# Patient Record
Sex: FEMALE | Race: ASIAN | Employment: FULL TIME | URBAN - METROPOLITAN AREA
[De-identification: names, ages, dates, MRNs, and addresses within clinical notes are randomized per-mention and may not be internally consistent; named-entity substitution may affect disease eponyms.]

---

## 2017-06-22 DIAGNOSIS — Z00.00 GENERAL MEDICAL EXAM: Primary | ICD-10-CM

## 2017-06-23 LAB — T PALLIDUM IGG+IGM SER QL: NEGATIVE

## 2017-10-05 DIAGNOSIS — R76.11 POSITIVE SKIN TEST FOR TUBERCULOSIS: Primary | ICD-10-CM

## 2017-10-05 PROCEDURE — 86480 TB TEST CELL IMMUN MEASURE: CPT | Performed by: CLINICAL NURSE SPECIALIST

## 2017-10-05 NOTE — NURSING NOTE
Chief Complaint   Patient presents with     Blood Draw     Labs drawn from left arm in clinic by MA     Pt tolerated well  Leti Cortez MA

## 2017-10-09 LAB
M TB TUBERC IFN-G BLD QL: NEGATIVE
M TB TUBERC IFN-G/MITOGEN IGNF BLD: 0.06 IU/ML

## 2018-01-30 DIAGNOSIS — J01.90 ACUTE NON-RECURRENT SINUSITIS, UNSPECIFIED LOCATION: Primary | ICD-10-CM

## 2018-01-30 RX ORDER — AZITHROMYCIN 250 MG/1
250 TABLET, FILM COATED ORAL DAILY
Qty: 6 TABLET | Refills: 0 | Status: SHIPPED | OUTPATIENT
Start: 2018-01-30 | End: 2018-06-14

## 2018-06-14 ENCOUNTER — OFFICE VISIT (OUTPATIENT)
Dept: OBGYN | Facility: CLINIC | Age: 38
End: 2018-06-14
Attending: OBSTETRICS & GYNECOLOGY
Payer: COMMERCIAL

## 2018-06-14 VITALS
DIASTOLIC BLOOD PRESSURE: 70 MMHG | HEIGHT: 67 IN | HEART RATE: 76 BPM | BODY MASS INDEX: 19.48 KG/M2 | WEIGHT: 124.1 LBS | SYSTOLIC BLOOD PRESSURE: 109 MMHG

## 2018-06-14 DIAGNOSIS — Z13.1 SCREENING FOR DIABETES MELLITUS: ICD-10-CM

## 2018-06-14 DIAGNOSIS — Z01.419 ENCOUNTER FOR GYNECOLOGICAL EXAMINATION WITHOUT ABNORMAL FINDING: ICD-10-CM

## 2018-06-14 DIAGNOSIS — Z12.4 SCREENING FOR MALIGNANT NEOPLASM OF CERVIX: ICD-10-CM

## 2018-06-14 DIAGNOSIS — Z13.21 ENCOUNTER FOR VITAMIN DEFICIENCY SCREENING: ICD-10-CM

## 2018-06-14 DIAGNOSIS — Z13.29 SCREENING FOR THYROID DISORDER: ICD-10-CM

## 2018-06-14 DIAGNOSIS — Z13.220 SCREENING FOR LIPOID DISORDERS: ICD-10-CM

## 2018-06-14 DIAGNOSIS — Z00.00 VISIT FOR PREVENTIVE HEALTH EXAMINATION: Primary | ICD-10-CM

## 2018-06-14 LAB
CHOLEST SERPL-MCNC: 161 MG/DL
HDLC SERPL-MCNC: 51 MG/DL
LDLC SERPL CALC-MCNC: 88 MG/DL
NONHDLC SERPL-MCNC: 110 MG/DL
TRIGL SERPL-MCNC: 111 MG/DL
TSH SERPL DL<=0.005 MIU/L-ACNC: 1.01 MU/L (ref 0.4–4)

## 2018-06-14 PROCEDURE — 80061 LIPID PANEL: CPT | Performed by: OBSTETRICS & GYNECOLOGY

## 2018-06-14 PROCEDURE — 82306 VITAMIN D 25 HYDROXY: CPT | Performed by: OBSTETRICS & GYNECOLOGY

## 2018-06-14 PROCEDURE — G0145 SCR C/V CYTO,THINLAYER,RESCR: HCPCS | Performed by: OBSTETRICS & GYNECOLOGY

## 2018-06-14 PROCEDURE — 87624 HPV HI-RISK TYP POOLED RSLT: CPT | Performed by: OBSTETRICS & GYNECOLOGY

## 2018-06-14 PROCEDURE — G0463 HOSPITAL OUTPT CLINIC VISIT: HCPCS | Mod: ZF

## 2018-06-14 PROCEDURE — 84443 ASSAY THYROID STIM HORMONE: CPT | Performed by: OBSTETRICS & GYNECOLOGY

## 2018-06-14 ASSESSMENT — ANXIETY QUESTIONNAIRES

## 2018-06-14 ASSESSMENT — PATIENT HEALTH QUESTIONNAIRE - PHQ9: 5. POOR APPETITE OR OVEREATING: NOT AT ALL

## 2018-06-14 ASSESSMENT — PAIN SCALES - GENERAL: PAINLEVEL: NO PAIN (0)

## 2018-06-14 NOTE — LETTER
2018       RE: Sakina Dash  [address]  [facility] [address]     Dear Colleague,    Thank you for referring your patient, Sakina Bowling, to [facility] Please see a copy of my visit note below.          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  She is doing well today with no concerns.  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  She has a 4 year old son that was delivered by c/s for arrest of dilation at 5 cm.  Her son was in the NICU for 5 days for r/o sepsis, all cultures were negative.  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  Her periods are regular.  She would like another pregnancy.   She has been attempting conception for the last 3-4 months.  She has used LH kits and get a positive result midcycle.  She has no h/o infertility prior to her first pregnancy.        Concerns today include: none    Menstrual History:  Menstrual History 2018   LAST MENSTRUAL PERIOD 2018 -   Menarche age - 12   Period Cycle (Days) - 28-30   Period Duration (Days) - 3-4   Period Pattern - Regular   Menstrual Flow - Moderate   Dysmenorrhea - Mild   PMS Symptoms - Cramping   Reviewed Today - Yes       Last  No results found for: PAP  History of abnormal Pap smear: NO - age 30-65 PAP every 5 years with negative HPV co-testing recommended    Last No results found for: HPV16  Last No results found for: HPV18  Last No results found for: HRHPV    Mammogram current: not applicable  Last Mammogram:   No results found.     Last Colonoscopy:  No results found for this or any previous visit.      HISTORY:    No current outpatient prescriptions on file prior to visit.  No current facility-administered medications on file prior to visit.   Allergies   Allergen Reactions     Bactrim [Sulfamethoxazole W/Trimethoprim] Rash       There is no immunization history  "on file for this patient.    Obstetric History       T1      L1     SAB0   TAB0   Ectopic0   Multiple0   Live Births0      Past Medical History:   Diagnosis Date     NO ACTIVE PROBLEMS      Past Surgical History:   Procedure Laterality Date     C/SECTION, LOW TRANSVERSE  2014     Family History   Problem Relation Age of Onset     Diabetes Mother      Hypertension Mother      Muscular Dystrophy Mother      Social History     Social History     Marital status:      Spouse name: N/A     Number of children: N/A     Years of education: N/A     Social History Main Topics     Smoking status: Never Smoker     Smokeless tobacco: Never Used     Alcohol use Yes      Comment: 1 every two months     Drug use: No     Sexual activity: Yes     Partners: Male     Other Topics Concern     None     Social History Narrative    How much exercise per week? Walking daily    How much calcium per day? In dairy       How much caffeine per day? 2 cups  tea    How much vitamin D per day? Sun light and foods    Do you/your family wear seatbelts?  Yes    Do you/your family use safety helmets? Yes    Do you/your family use sunscreen? Yes    Do you/your family keep firearms in the home? No    Do you/your family have a smoke detector(s)? Yes        [facility]                    ROS  [unfilled]  PHQ-9 SCORE 2018   Total Score 0     KATIA-7 SCORE 2018   Total Score 0         EXAM:  Blood pressure 109/70, pulse 76, height 1.702 m (5' 7\"), weight 56.3 kg (124 lb 1.6 oz), last menstrual period 2018. Body mass index is 19.44 kg/(m^2).  General - pleasant female in no acute distress.  Skin - no suspicious lesions or rashes  EENT-  PERRLA, euthyroid with out palpable nodules  Neck - supple without lymphadenopathy.  Lungs - clear to auscultation bilaterally.  Heart - regular rate and rhythm without murmur.  Abdomen - soft, nontender, nondistended, no masses or organomegaly noted.  Musculoskeletal - no " gross deformities.  Neurological - normal strength, sensation, and mental status.    Breast Exam:  Breast: Without visible skin changes. No dimpling or lesions seen.   Breasts supple, non-tender with palpation, no dominant mass, nodularity, or nipple discharge noted bilaterally. Axillary nodes negative.      Pelvic Exam:  EG/BUS: Normal genital architecture without lesions, erythema or abnormal secretions Bartholin's, Urethra, Hubbell's normal   Urethral meatus: normal   Urethra: no masses, tenderness, or scarring   Bladder: no masses or tenderness   Vagina: moist, pink, rugae with creamy, white and odorless  secretions  Cervix: Nulliparous, and no lesions  Uterus: small, mobile, no masses  Adnexa: Within normal limits and No masses, nodularity, tenderness  Rectum:anus normal       ASSESSMENT:  Encounter Diagnoses   Name Primary?     Visit for preventive health examination Yes     Screening for diabetes mellitus      Screening for lipoid disorders      Encounter for vitamin deficiency screening      Screening for malignant neoplasm of cervix      Encounter for gynecological examination without abnormal finding      Screening for thyroid disorder         PLAN:   Orders Placed This Encounter   Procedures     Pelvic and Breast Exam Procedure []     Pap Smear Exam [] Do Not Remove     Pap imaged thin layer screen with HPV - recommended age 30 - 65 years (select HPV order below)     HPV High Risk Types DNA Cervical     25- OH-Vitamin D     TSH with free T4 reflex     Lipid panel reflex to direct LDL Fasting     Fasting Glucose [[id number]]     Discussed fertility testing if no spontaneous conception after 4-6 months of timed IC given her age if she desires.      Additional teaching done at this visit regarding preconception.    Return to clinic in one year.  Follow-up as needed.    Arianne Guerra MD, FACOG

## 2018-06-14 NOTE — NURSING NOTE
Labs completed via venipuncture, patient discharged.    See flow sheets.    Sumi Orr CMA ([facility])

## 2018-06-14 NOTE — MR AVS SNAPSHOT
After Visit Summary   6/14/2018    Sakina Bowling    MRN: [id number]           Patient Information     Date Of Birth          1980        Visit Information        Provider Department      6/14/2018 8:00 AM Arianne Guerra MD [facility]        Today's Diagnoses     Visit for preventive health examination    -  1    Screening for diabetes mellitus        Screening for lipoid disorders        Encounter for vitamin deficiency screening        Screening for malignant neoplasm of cervix        Encounter for gynecological examination without abnormal finding        Screening for thyroid disorder          Care Instructions      PREVENTIVE HEALTH RECOMMENDATIONS:   Most women need a yearly breast and pelvic exam.    A PAP screen, a test done DURING a pelvic exam, is NO longer recommended yearly.    March 2013, screening guidelines recommended by ACOG for PAP screen are:    1) First pap at age 21.    2) Pap every 3 years until age 30.    3) After age 30, pap every 3 years or Pap with HR HPV screen every 5 years until age 65.  4) Women do NOT need a vaginal Pap screen after a hysterectomy (surgical removal of the uterus) when they have not had cancer.    Exceptions:  1) Yearly pap if HIV+ or immunosuppressed secondary to organ transplant  2) PERNELL II-III continue routine screening for 20 years.    I encourage you continue looking for opportunities to choose a healthy lifestyle:       * Choose to eat a heart healthy diet. Check out the FOOD PLATE guidelines at: http://www.choosemyplate.gov/ for helpful hints on weight and cholesterol management.  Balance your caloric intake with exercise to maintain a BMI in the 22 to 26 range. For bone health: Eat calcium-rich foods like yogurt, broccoli or take chewable calcium pills (500 to 600 mg) twice a day with food.       * Exercise for at least an average of 30 minutes a day, 5 days of the week. This will help you control your weight,  release stress, and help prevent disease.      * Take a Vitamin D3 supplement daily fall through spring and during summer unless you [id number]' full body sun exposure to skin without sunscreen.      * DO wear sunscreen to prevent skin cancer after the first 15-30 minutes.      * Identify stressors in your life, find ways to release the stress, and, make time for yourself. PLEASE ask for help if mood changes last longer than two weeks.     * Limit alcohol to one drink per day.  No smoking.  Avoid second hand smoke. If you smoke, ask for help to stop.       *  If you are in a sexual relationship, talk with your partner about possible infection risks and take action to protect yourself from exposure to a sexual infection.    Please request an infection screen for STIs (sexually transmitted infections) if you are less than age 26 OR believe that you may be at risk.     Get a flu shot each year. Get a tetanus shot every 10 years. EVERYONE needs a pertussis (Whooping cough) booster.    See your dentist twice a year for an exam and preventive care cleaning.     Consider the following screen tests:    1) cholesterol test every 5 years.     2) yearly mammogram after age 40 unless you have identified risks.    3) colonoscopy every 10 years after age 50 unless you have identified risks.    4) diabetes blood test screening if you are at risk for diabetes.      Additional information that you may also find helpful:  The Internet now gives us access to LOTS of information -- some of it helpful, research documented and also plenty of harmful, anecdotal information that may not pertain to your situtaion. Consider visiting the following websites for accurate health information:    www.vitamindcouncil.org/ : Info and ongoing research re Vitamin D    www.fairview.org : Up to date and easily searchable information on multiple topics.    www.medlineplus.gov : medication info, interactive tutorials, watch real surgeries  "online    www.cdc.gov : public health info, travel advisories, epidemics (H1N1)    www.courtney/std.org: current research re diagnosis, treatment and prevention of sexually contacted infections.    www.health.state.[place] : [place] dept of heat, public health issues in [place], N1N1    www.familydoctor.org : good info from the Academy of Family Physicians                Follow-ups after your visit        Follow-up notes from your care team     Return in 1 year (on 2019) for Preventative Health Visit.      Who to contact     Please call your clinic at [phone] to:    Ask questions about your health    Make or cancel appointments    Discuss your medicines    Learn about your test results    Speak to your doctor            Additional Information About Your Visit        MyChart Information     [employer] is an electronic gateway that provides easy, online access to your medical records. With [employer], you can request a clinic appointment, read your test results, renew a prescription or communicate with your care team.     To sign up for [employer] visit the website at [email or web address]   You will be asked to enter the access code listed below, as well as some personal information. Please follow the directions to create your username and password.     Your access code is: [email or web address]  Expires: 2018  8:18 AM     Your access code will  in 90 days. If you need help or a new code, please contact your [facility] or call [phone] for assistance.        Care EveryWhere ID     This is your Care EveryWhere ID. This could be used by other organizations to access your [place] medical records  [id number]        Your Vitals Were     Pulse Height Last Period BMI (Body Mass Index)          76 1.702 m (5' 7\") 2018 19.44 kg/m2         Blood Pressure from Last 3 Encounters:   18 109/70    Weight from Last 3 Encounters:   18 56.3 kg (124 lb 1.6 oz)              We Performed " the Following     25- OH-Vitamin D     HPV High Risk Types DNA Cervical     Lipid panel reflex to direct LDL Fasting     Pap imaged thin layer screen with HPV - recommended age 30 - 65 years (select HPV order below)     Pap Smear Exam [] Do Not Remove     Pelvic and Breast Exam Procedure []     TSH with free T4 reflex          Today's Medication Changes          These changes are accurate as of 6/14/18 11:59 PM.  If you have any questions, ask your nurse or doctor.               Stop taking these medicines if you haven't already. Please contact your care team if you have questions.     azithromycin 250 MG tablet   Commonly known as:  ZITHROMAX   Stopped by:  Arianne Guerra MD                    Primary Care Provider Fax #    Physician No Ref-Primary [phone]       No address on file        Equal Access to Services     CHELY MOBLEY : Chay hardwicko Kyle, waaxda luqadaha, qaybta kaalmada adeabrry, dewey russell. So [facility] [phone].    ATENCIÓN: Si habla español, tiene a fry disposición servicios gratuitos de asistencia lingüística. Llame al [phone].    We comply with applicable federal civil rights laws and [place] laws. We do not discriminate on the basis of race, color, national origin, age, disability, sex, sexual orientation, or gender identity.            Thank you!     Thank you for choosing [facility]  for your care. Our goal is always to provide you with excellent care. Hearing back from our patients is one way we can continue to improve our services. Please take a few minutes to complete the written survey that you may receive in the mail after your visit with us. Thank you!             Your Updated Medication List - Protect others around you: Learn how to safely use, store and throw away your medicines at www.disposemymeds.org.      Notice  As of 6/14/2018 11:59 PM    You have not been prescribed any medications.

## 2018-06-14 NOTE — PROGRESS NOTES
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  She is doing well today with no concerns.  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  Her son was in the NICU for 5 days for r/o sepsis, all cultures were negative.  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  Her periods are regular.  She would like another pregnancy.   She has been attempting conception for the last 3-4 months.  She has used LH kits and get a positive result midcycle.  She has no h/o infertility prior to her first pregnancy.        Concerns today include: none    Menstrual History:  Menstrual History 2018   LAST MENSTRUAL PERIOD 2018 -   Menarche age - 12   Period Cycle (Days) - 28-30   Period Duration (Days) - 3-4   Period Pattern - Regular   Menstrual Flow - Moderate   Dysmenorrhea - Mild   PMS Symptoms - Cramping   Reviewed Today - Yes       Last  No results found for: PAP  History of abnormal Pap smear: NO - age 30-65 PAP every 5 years with negative HPV co-testing recommended    Last No results found for: HPV16  Last No results found for: HPV18  Last No results found for: HRHPV    Mammogram current: not applicable  Last Mammogram:   No results found.     Last Colonoscopy:  No results found for this or any previous visit.      HISTORY:    No current outpatient prescriptions on file prior to visit.  No current facility-administered medications on file prior to visit.   Allergies   Allergen Reactions     Bactrim [Sulfamethoxazole W/Trimethoprim] Rash       There is no immunization history on file for this patient.    Obstetric History       T1      L1     SAB0   TAB0   Ectopic0   Multiple0   Live Births0      Past Medical History:   Diagnosis Date     NO ACTIVE PROBLEMS      Past Surgical History:   Procedure Laterality Date     C/SECTION, LOW  "TRANSVERSE  01/16/2014     Family History   Problem Relation Age of Onset     Diabetes Mother      Hypertension Mother      Muscular Dystrophy Mother      Social History     Social History     Marital status:      Spouse name: N/A     Number of children: N/A     Years of education: N/A     Social History Main Topics     Smoking status: Never Smoker     Smokeless tobacco: Never Used     Alcohol use Yes      Comment: 1 every two months     Drug use: No     Sexual activity: Yes     Partners: Male     Other Topics Concern     None     Social History Narrative    How much exercise per week? Walking daily    How much calcium per day? In dairy       How much caffeine per day? 2 cups  tea    How much vitamin D per day? Sun light and foods    Do you/your family wear seatbelts?  Yes    Do you/your family use safety helmets? Yes    Do you/your family use sunscreen? Yes    Do you/your family keep firearms in the home? No    Do you/your family have a smoke detector(s)? Yes        Reviewed [facility] 6-                    ROS  [unfilled]  PHQ-9 SCORE 6/14/2018   Total Score 0     KATIA-7 SCORE 6/14/2018   Total Score 0         EXAM:  Blood pressure 109/70, pulse 76, height 1.702 m (5' 7\"), weight 56.3 kg (124 lb 1.6 oz), last menstrual period 05/17/2018. Body mass index is 19.44 kg/(m^2).  General - pleasant female in no acute distress.  Skin - no suspicious lesions or rashes  EENT-  PERRLA, euthyroid with out palpable nodules  Neck - supple without lymphadenopathy.  Lungs - clear to auscultation bilaterally.  Heart - regular rate and rhythm without murmur.  Abdomen - soft, nontender, nondistended, no masses or organomegaly noted.  Musculoskeletal - no gross deformities.  Neurological - normal strength, sensation, and mental status.    Breast Exam:  Breast: Without visible skin changes. No dimpling or lesions seen.   Breasts supple, non-tender with palpation, no dominant mass, nodularity, or nipple discharge noted " bilaterally. Axillary nodes negative.      Pelvic Exam:  EG/BUS: Normal genital architecture without lesions, erythema or abnormal secretions Bartholin's, Urethra, Blackwell's normal   Urethral meatus: normal   Urethra: no masses, tenderness, or scarring   Bladder: no masses or tenderness   Vagina: moist, pink, rugae with creamy, white and odorless  secretions  Cervix: Nulliparous, and no lesions  Uterus: small, mobile, no masses  Adnexa: Within normal limits and No masses, nodularity, tenderness  Rectum:anus normal       ASSESSMENT:  Encounter Diagnoses   Name Primary?     Visit for preventive health examination Yes     Screening for diabetes mellitus      Screening for lipoid disorders      Encounter for vitamin deficiency screening      Screening for malignant neoplasm of cervix      Encounter for gynecological examination without abnormal finding      Screening for thyroid disorder         PLAN:   Orders Placed This Encounter   Procedures     Pelvic and Breast Exam Procedure []     Pap Smear Exam [] Do Not Remove     Pap imaged thin layer screen with HPV - recommended age 30 - 65 years (select HPV order below)     HPV High Risk Types DNA Cervical     25- OH-Vitamin D     TSH with free T4 reflex     Lipid panel reflex to direct LDL Fasting     Fasting Glucose [CUC1982]     Discussed fertility testing if no spontaneous conception after 4-6 months of timed IC given her age if she desires.      Additional teaching done at this visit regarding preconception.    Return to clinic in one year.  Follow-up as needed.    Arianne Geurra MD, FACOG

## 2018-06-14 NOTE — LETTER
6/26/2018         Ho Jenkins   [address] [address]        Dear Dr. Jenkins:    The results of your recent labs are all normal with the exception of you vitamin D which is a little low.  At the end of this letter I have attached our clinic's recommendations for vitamin D supplementation.       Results for orders placed or performed in visit on 06/14/18   Pap imaged thin layer screen with HPV - recommended age 30 - 65 years (select HPV order below)   Result Value Ref Range    PAP NIL     Copath Report         Patient Name: HO JENKINS  MR#: [id number]  Specimen #: [id number]  Collected: 6/14/2018  Received: 6/15/2018  Reported: 6/19/2018 08:18  Ordering Phy(s): CYRUS SEGURA    For improved result formatting, select 'View Enhanced Report Format' under   Linked Documents section.    SPECIMEN/STAIN PROCESS:  Pap imaged thin layer prep screening (Surepath, FocalPoint with guided   screening)       Pap-Cyto x 1, HPV ordered x 1    SOURCE: Cervical, endocervical  ----------------------------------------------------------------   Pap imaged thin layer prep screening (Surepath, FocalPoint with guided   screening)  SPECIMEN ADEQUACY:  Satisfactory for evaluation.  -Transformation zone component present.    CYTOLOGIC INTERPRETATION:    Negative for intraepithelial lesion or malignancy    Electronically signed out by:  GEORGETTE Mata (ASCP)    Processed and screened at [facility]    CLINICAL HISTORY:  LMP: 5/17/2018    Papan icolaou Test Limitations:  Cervical cytology is a screening test with   limited sensitivity; regular  screening is critical for cancer prevention; Pap tests are primarily   effective for the diagnosis/prevention of  squamous cell carcinoma, not adenocarcinomas or other cancers.    TESTING LAB LOCATION:  [facility], [address]   [address]  [phone]    COLLECTION SITE:  Client:  [facility], [place]  Location: [facility] (B)       HPV High Risk Types DNA Cervical   Result Value Ref Range    HPV Source SurePath     HPV 16 DNA Negative NEG^Negative    HPV 18 DNA Negative NEG^Negative    Other HR HPV Negative NEG^Negative    Final Diagnosis This patient's sample is negative for HPV DNA.     Specimen Description Cervical Cells    25- OH-Vitamin D   Result Value Ref Range    Vitamin D Deficiency screening 14 (L) 20 - 75 ug/L   TSH with free T4 reflex   Result Value Ref Range    TSH 1.01 0.40 - 4.00 mU/L   Lipid panel reflex to direct LDL Fasting   Result Value Ref Range    Cholesterol 161 <200 mg/dL    Triglycerides 111 <150 mg/dL    HDL Cholesterol 51 >49 mg/dL    LDL Cholesterol Calculated 88 <100 mg/dL    Non HDL Cholesterol 110 <130 mg/dL         Please note that test explanations are brief and do not reflect all diagnostic uses.  If you have any questions or concerns, please call the clinic at [phone].      Sincerely,      Arianne Guerra MD      Vitamin D deficiency is defined as anything < 30 ng/ml.  I. Adult women who are not pregnant or of nonreproductive age:    No deficiency: 1000IU/day    Deficiency <30: 5555-1122 IU/day for 8 weeks then 2000IU/day    Medical history (obesity, malabsorption, medications*) and no deficiency: 2000IU/day    Medical history* and deficiency <30: 10,000IU/day for 8 weeks then 0676-7364 IU/day  II. Pregnant women:    Pregnancy no deficiency: 2000 IU/day    Pregnancy with deficiency: 9485-5372 IU/day throughout pregnancy    Lactating women no deficiency: 2000 IU/day    Lactating women with deficiency: 4283-3054 IU/day   Supplementation: Patients should take D3 (cholecalciferol).  This is the type of Vitamin D available in ALL of the OTC products.  It is typically available in 1000 and 2000 units tablet or capsule, or in 5000 unit capsules.  For patients who will not take  gelatin, they can take 2 - 4000 unit tablets daily  Testing of Vitamin D (25OH): Well Women Exam, OB intake, 28 weeks and  recheck deficiency.   Referral: If a patient has questions, a complicated medical history or multiple medication recommend a consult with Kristi Hernandez, Pharm D.       .

## 2018-06-14 NOTE — PATIENT INSTRUCTIONS

## 2018-06-15 LAB — DEPRECATED CALCIDIOL+CALCIFEROL SERPL-MC: 14 UG/L (ref 20–75)

## 2018-06-15 ASSESSMENT — PATIENT HEALTH QUESTIONNAIRE - PHQ9: SUM OF ALL RESPONSES TO PHQ QUESTIONS 1-9: 0

## 2018-06-15 ASSESSMENT — ANXIETY QUESTIONNAIRES: GAD7 TOTAL SCORE: 0

## 2018-06-19 LAB
COPATH REPORT: NORMAL
PAP: NORMAL

## 2018-06-20 LAB
FINAL DIAGNOSIS: NORMAL
HPV HR 12 DNA CVX QL NAA+PROBE: NEGATIVE
HPV16 DNA SPEC QL NAA+PROBE: NEGATIVE
HPV18 DNA SPEC QL NAA+PROBE: NEGATIVE
SPECIMEN DESCRIPTION: NORMAL
SPECIMEN SOURCE CVX/VAG CYTO: NORMAL

## 2018-12-28 DIAGNOSIS — J02.9 PHARYNGITIS, UNSPECIFIED ETIOLOGY: Primary | ICD-10-CM

## 2018-12-28 RX ORDER — AZITHROMYCIN 500 MG/1
500 TABLET, FILM COATED ORAL DAILY
Qty: 3 TABLET | Refills: 0 | Status: SHIPPED | OUTPATIENT
Start: 2018-12-28 | End: 2018-12-31

## 2019-01-24 DIAGNOSIS — H93.90 EAR LESION: Primary | ICD-10-CM

## 2019-01-24 RX ORDER — OFLOXACIN 3 MG/ML
5 SOLUTION AURICULAR (OTIC) 2 TIMES DAILY
Qty: 5 ML | Refills: 0 | Status: SHIPPED | OUTPATIENT
Start: 2019-01-24 | End: 2019-01-31

## 2019-10-16 DIAGNOSIS — J01.90 ACUTE NON-RECURRENT SINUSITIS, UNSPECIFIED LOCATION: Primary | ICD-10-CM

## 2019-10-16 RX ORDER — AZITHROMYCIN 250 MG/1
500 TABLET, FILM COATED ORAL DAILY
Qty: 10 TABLET | Refills: 0 | Status: SHIPPED | OUTPATIENT
Start: 2019-10-16 | End: 2019-10-21

## 2020-02-05 ENCOUNTER — HOSPITAL ENCOUNTER (OUTPATIENT)
Dept: CARDIOLOGY | Facility: CLINIC | Age: 40
Discharge: HOME | End: 2020-02-05
Attending: INTERNAL MEDICINE | Admitting: INTERNAL MEDICINE
Payer: COMMERCIAL

## 2020-02-05 ENCOUNTER — HOSPITAL ENCOUNTER (OUTPATIENT)
Dept: CARDIOLOGY | Facility: CLINIC | Age: 40
End: 2020-02-05
Attending: INTERNAL MEDICINE
Payer: COMMERCIAL

## 2020-02-05 DIAGNOSIS — R07.9 CHEST PAIN, UNSPECIFIED TYPE: Primary | ICD-10-CM

## 2020-02-05 PROCEDURE — 93005 ELECTROCARDIOGRAM TRACING: CPT

## 2020-02-05 PROCEDURE — 93306 TTE W/DOPPLER COMPLETE: CPT | Mod: 26 | Performed by: INTERNAL MEDICINE

## 2020-02-05 PROCEDURE — 93010 ELECTROCARDIOGRAM REPORT: CPT | Performed by: INTERNAL MEDICINE

## 2020-02-05 PROCEDURE — 93306 TTE W/DOPPLER COMPLETE: CPT

## 2020-02-06 LAB — INTERPRETATION ECG - MUSE: NORMAL

## 2020-03-30 ENCOUNTER — MEDICAL CORRESPONDENCE (OUTPATIENT)
Dept: HEALTH INFORMATION MANAGEMENT | Facility: CLINIC | Age: 40
End: 2020-03-30

## 2020-06-18 DIAGNOSIS — Z31.41 FERTILITY TESTING: Primary | ICD-10-CM

## 2020-06-18 LAB
ESTRADIOL SERPL-MCNC: 38 PG/ML
FSH SERPL-ACNC: 8.5 IU/L
LH SERPL-ACNC: 3.4 IU/L

## 2020-06-20 LAB — MIS SERPL-MCNC: 1.67 NG/ML

## 2020-07-23 ENCOUNTER — OFFICE VISIT (OUTPATIENT)
Dept: OPHTHALMOLOGY | Facility: CLINIC | Age: 40
End: 2020-07-23
Payer: COMMERCIAL

## 2020-07-23 DIAGNOSIS — S05.92XA LEFT ORBIT TRAUMA, INITIAL ENCOUNTER: Primary | ICD-10-CM

## 2020-07-23 DIAGNOSIS — S09.93XA FACIAL TRAUMA: Primary | ICD-10-CM

## 2020-07-23 ASSESSMENT — SLIT LAMP EXAM - LIDS
COMMENTS: NORMAL
COMMENTS: NORMAL

## 2020-07-23 ASSESSMENT — CONF VISUAL FIELD
OS_NORMAL: 1
METHOD: COUNTING FINGERS
OD_NORMAL: 1

## 2020-07-23 ASSESSMENT — VISUAL ACUITY
OS_SC+: -1
OD_SC+: -1
OS_SC: 20/20
METHOD: SNELLEN - LINEAR
OD_SC: 20/20

## 2020-07-23 ASSESSMENT — TONOMETRY
OS_IOP_MMHG: 10
IOP_METHOD: ICARE
OD_IOP_MMHG: 9

## 2020-07-23 ASSESSMENT — CUP TO DISC RATIO
OS_RATIO: 0.3
OD_RATIO: 0.3

## 2020-07-23 ASSESSMENT — EXTERNAL EXAM - RIGHT EYE: OD_EXAM: NORMAL

## 2020-07-23 NOTE — NURSING NOTE
Chief Complaints and History of Present Illnesses   Patient presents with     Eye Pain Left Eye     Chief Complaint(s) and History of Present Illness(es)     Eye Pain Left Eye     Laterality: In left eye    Quality: aching    Frequency: constantly    Duration: 1 month    Course: stable    Associated symptoms: Negative for double vision    Treatments tried: no treatments    Response to treatment: no improvement              Comments     Fall 4-5 weeks ago- pt has since has some tenderness around the left eye. No double vision. No pain with eye movement. Tenderness where injury was lateral to the left eye. No vision changes. No glasses.     LUIS Walters COT 2:27 PM July 23, 2020

## 2020-07-23 NOTE — PROGRESS NOTES
History  HPI     Eye Pain Left Eye       In left eye.  Characterized as aching.  Occurring constantly.  Duration of 1 month.  Since onset it is stable.  Associated symptoms include Negative for double vision.  Treatments tried include no treatments.  Response to treatment was no improvement.              Comments     Fall 4-5 weeks ago- pt has since has some tenderness around the left eye. No double vision. No pain with eye movement. Tenderness where injury was lateral to the left eye. No vision changes. No glasses.     LUIS Walters COT 2:27 PM July 23, 2020             Last edited by Thomas Cheng COT on 7/23/2020  2:27 PM. (History)          Assessment/Plan  (S05.92XA) Left orbit trauma, initial encounter  (primary encounter diagnosis)  Comment: Facial trauma following a fall 4 weeks ago, ocular health unremarkable, tenderness with palpation, radiating irritation  Plan:  Educated patient on clinical findings. Given location of injury and normal ocular examination, referred to ENT for consultation with CT.    Complete documentation of historical and exam elements from today's encounter can  be found in the full encounter summary report (not reduplicated in this progress  note). I personally obtained the chief complaint(s) and history of present illness. I  confirmed and edited as necessary the review of systems, past medical/surgical  history, family history, social history, and examination findings as documented by  others; and I examined the patient myself. I personally reviewed the relevant tests,  images, and reports as documented above. I formulated and edited as necessary the  assessment and plan and discussed the findings and management plan with the  patient and family.    Matthew Lanier OD, FAAO

## 2020-07-24 ENCOUNTER — HOSPITAL ENCOUNTER (OUTPATIENT)
Dept: CT IMAGING | Facility: CLINIC | Age: 40
Discharge: HOME | End: 2020-07-24
Attending: OTOLARYNGOLOGY | Admitting: OTOLARYNGOLOGY
Payer: COMMERCIAL

## 2020-07-24 DIAGNOSIS — S09.93XA FACIAL TRAUMA: ICD-10-CM

## 2020-07-24 PROCEDURE — 70486 CT MAXILLOFACIAL W/O DYE: CPT

## 2020-07-24 NOTE — TELEPHONE ENCOUNTER
FUTURE VISIT INFORMATION      FUTURE VISIT INFORMATION:    Date: 7/29/2020    Time: 3PM    Location: [facility] [place]  REFERRAL INFORMATION:    Referring provider:      Referring providers clinic:      Reason for visit/diagnosis  New Facial Trauma    RECORDS REQUESTED FROM:       Clinic name Comments Records Status Imaging Status   Imaging 7/24/2020 CT [facility] PACS   [facility] EYE 7/23/2020 note from Dr Yolande Yu

## 2020-07-29 ENCOUNTER — PRE VISIT (OUTPATIENT)
Dept: OTOLARYNGOLOGY | Facility: CLINIC | Age: 40
End: 2020-07-29

## 2020-08-09 ENCOUNTER — RESULTS ONLY (OUTPATIENT)
Dept: LAB | Age: 40
End: 2020-08-09

## 2020-08-09 ENCOUNTER — APPOINTMENT (OUTPATIENT)
Dept: URGENT CARE | Facility: URGENT CARE | Age: 40
End: 2020-08-09
Payer: COMMERCIAL

## 2020-08-10 LAB
SARS-COV-2 RNA SPEC QL NAA+PROBE: NOT DETECTED
SPECIMEN SOURCE: NORMAL

## 2020-08-11 ENCOUNTER — APPOINTMENT (OUTPATIENT)
Dept: FAMILY MEDICINE | Facility: CLINIC | Age: 40
End: 2020-08-11
Payer: COMMERCIAL

## 2020-08-11 ENCOUNTER — RESULTS ONLY (OUTPATIENT)
Dept: LAB | Age: 40
End: 2020-08-11

## 2020-08-12 LAB
SARS-COV-2 RNA SPEC QL NAA+PROBE: NOT DETECTED
SPECIMEN SOURCE: NORMAL

## 2020-08-14 ENCOUNTER — TRANSFERRED RECORDS (OUTPATIENT)
Dept: HEALTH INFORMATION MANAGEMENT | Facility: CLINIC | Age: 40
End: 2020-08-14

## 2020-10-27 ENCOUNTER — TELEPHONE (OUTPATIENT)
Dept: OBGYN | Facility: CLINIC | Age: 40
End: 2020-10-27

## 2020-10-27 ENCOUNTER — PREP FOR PROCEDURE (OUTPATIENT)
Dept: OBGYN | Facility: CLINIC | Age: 40
End: 2020-10-27

## 2020-10-27 ENCOUNTER — HOSPITAL ENCOUNTER (OUTPATIENT)
Facility: CLINIC | Age: 40
End: 2020-10-27
Attending: OBSTETRICS & GYNECOLOGY | Admitting: OBSTETRICS & GYNECOLOGY
Payer: COMMERCIAL

## 2020-10-27 DIAGNOSIS — D25.1 INTRAMURAL LEIOMYOMA OF UTERUS: ICD-10-CM

## 2020-10-27 DIAGNOSIS — D25.1 INTRAMURAL LEIOMYOMA OF UTERUS: Primary | ICD-10-CM

## 2020-10-27 RX ORDER — CEFAZOLIN SODIUM 1 G/3ML
1 INJECTION, POWDER, FOR SOLUTION INTRAMUSCULAR; INTRAVENOUS SEE ADMIN INSTRUCTIONS
Status: CANCELLED | OUTPATIENT
Start: 2020-10-27

## 2020-10-27 RX ORDER — CEFAZOLIN SODIUM 2 G/100ML
2 INJECTION, SOLUTION INTRAVENOUS
Status: CANCELLED | OUTPATIENT
Start: 2020-10-27

## 2020-10-27 RX ORDER — ACETAMINOPHEN 325 MG/1
975 TABLET ORAL ONCE
Status: CANCELLED | OUTPATIENT
Start: 2020-10-27 | End: 2020-10-27

## 2020-10-27 NOTE — TELEPHONE ENCOUNTER
I spoke with Kassandra to discuss management of fibroid uterus.  Her history is significant for a 6-7 cm anterior/fundal fibroid.  She has a history of 1 term pregnancy delivered via  in  and then a miscarriage (chemical pregnancy) in May 2019.  After that she had fever and pain which was thought to be related to fibroid degeneration.  She is now undergoing fertility treatment with Dr. Adams at [facility].  The myoma made egg retrieval difficult and SIS showed some distortion of the endometrium.  Dr. Adams recommended myomectomy prior to proceeding with further fertility treatments.    We discussed myomectomy, robotic assisted laparoscopic vs open.  We will attempt to get the images from her outside clinics, but on review of imaging reports I think it is reasonable to consider robotic approach.  We discussed the impact on future pregnancies, including repeat  at 36-37 weeks and risk of uterine rupture.      Plan to look for possible OR dates and schedule in clinic visit prior to planned surgery.    Meena Miranda MD

## 2020-10-28 DIAGNOSIS — Z11.59 ENCOUNTER FOR SCREENING FOR OTHER VIRAL DISEASES: Primary | ICD-10-CM

## 2020-11-12 ENCOUNTER — TELEPHONE (OUTPATIENT)
Dept: OBGYN | Facility: CLINIC | Age: 40
End: 2020-11-12

## 2021-07-02 DIAGNOSIS — Z11.59 ENCOUNTER FOR SCREENING FOR OTHER VIRAL DISEASES: Primary | ICD-10-CM

## 2021-09-02 ENCOUNTER — LAB (OUTPATIENT)
Dept: LAB | Facility: CLINIC | Age: 41
End: 2021-09-02
Attending: CLINICAL NURSE SPECIALIST
Payer: COMMERCIAL

## 2021-09-02 DIAGNOSIS — D25.1 INTRAMURAL LEIOMYOMA OF UTERUS: Primary | ICD-10-CM

## 2021-09-02 DIAGNOSIS — D25.1 INTRAMURAL LEIOMYOMA OF UTERUS: ICD-10-CM

## 2021-09-02 LAB
BASOPHILS # BLD AUTO: 0 10E3/UL (ref 0–0.2)
BASOPHILS NFR BLD AUTO: 0 %
EOSINOPHIL # BLD AUTO: 0.3 10E3/UL (ref 0–0.7)
EOSINOPHIL NFR BLD AUTO: 3 %
ERYTHROCYTE [DISTWIDTH] IN BLOOD BY AUTOMATED COUNT: 15.2 % (ref 10–15)
FERRITIN SERPL-MCNC: 5 NG/ML (ref 12–150)
HCT VFR BLD AUTO: 38.6 % (ref 35–47)
HGB BLD-MCNC: 11.7 G/DL (ref 11.7–15.7)
IMM GRANULOCYTES # BLD: 0 10E3/UL
IMM GRANULOCYTES NFR BLD: 0 %
IRON SATN MFR SERPL: 5 % (ref 15–46)
IRON SERPL-MCNC: 33 UG/DL (ref 35–180)
LYMPHOCYTES # BLD AUTO: 2.6 10E3/UL (ref 0.8–5.3)
LYMPHOCYTES NFR BLD AUTO: 27 %
MCH RBC QN AUTO: 24 PG (ref 26.5–33)
MCHC RBC AUTO-ENTMCNC: 30.3 G/DL (ref 31.5–36.5)
MCV RBC AUTO: 79 FL (ref 78–100)
MONOCYTES # BLD AUTO: 0.6 10E3/UL (ref 0–1.3)
MONOCYTES NFR BLD AUTO: 6 %
NEUTROPHILS # BLD AUTO: 6.1 10E3/UL (ref 1.6–8.3)
NEUTROPHILS NFR BLD AUTO: 64 %
NRBC # BLD AUTO: 0 10E3/UL
NRBC BLD AUTO-RTO: 0 /100
PLATELET # BLD AUTO: 341 10E3/UL (ref 150–450)
RBC # BLD AUTO: 4.88 10E6/UL (ref 3.8–5.2)
TIBC SERPL-MCNC: 601 UG/DL (ref 240–430)
WBC # BLD AUTO: 9.7 10E3/UL (ref 4–11)

## 2021-09-02 PROCEDURE — 82728 ASSAY OF FERRITIN: CPT

## 2021-09-02 PROCEDURE — 36415 COLL VENOUS BLD VENIPUNCTURE: CPT

## 2021-09-02 PROCEDURE — 83550 IRON BINDING TEST: CPT

## 2021-09-02 PROCEDURE — 85025 COMPLETE CBC W/AUTO DIFF WBC: CPT

## 2021-09-02 NOTE — NURSING NOTE
Chief Complaint   Patient presents with     Blood Draw     Labs drawn via  by RN in lab.      Labs collected from venipuncture by RN. Pt tolerated well.     Norma Jackson RN

## 2021-10-19 ENCOUNTER — TELEPHONE (OUTPATIENT)
Dept: INTERNAL MEDICINE | Facility: CLINIC | Age: 41
End: 2021-10-19

## 2021-10-19 NOTE — TELEPHONE ENCOUNTER
Call patient and left message to call [facility] to schedule new patient appointment with Dr Crawford for establish care. [facility] number given to call for scheduling.

## 2022-01-04 ENCOUNTER — LAB (OUTPATIENT)
Dept: LAB | Facility: CLINIC | Age: 42
End: 2022-01-04
Attending: CLINICAL NURSE SPECIALIST
Payer: COMMERCIAL

## 2022-01-04 DIAGNOSIS — Z20.822 SUSPECTED COVID-19 VIRUS INFECTION: Primary | ICD-10-CM

## 2022-01-04 DIAGNOSIS — Z20.822 SUSPECTED COVID-19 VIRUS INFECTION: ICD-10-CM

## 2022-01-04 LAB — SARS-COV-2 RNA RESP QL NAA+PROBE: NEGATIVE

## 2022-01-04 PROCEDURE — U0005 INFEC AGEN DETEC AMPLI PROBE: HCPCS | Mod: 90 | Performed by: PATHOLOGY

## 2022-01-04 PROCEDURE — U0003 INFECTIOUS AGENT DETECTION BY NUCLEIC ACID (DNA OR RNA); SEVERE ACUTE RESPIRATORY SYNDROME CORONAVIRUS 2 (SARS-COV-2) (CORONAVIRUS DISEASE [COVID-19]), AMPLIFIED PROBE TECHNIQUE, MAKING USE OF HIGH THROUGHPUT TECHNOLOGIES AS DESCRIBED BY CMS-2020-01-R: HCPCS | Mod: 90 | Performed by: PATHOLOGY

## 2022-01-04 PROCEDURE — 99000 SPECIMEN HANDLING OFFICE-LAB: CPT | Performed by: PATHOLOGY

## 2022-01-29 ENCOUNTER — HEALTH MAINTENANCE LETTER (OUTPATIENT)
Age: 42
End: 2022-01-29

## 2022-02-01 NOTE — PROGRESS NOTES
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 She does not smoke nor abuse EtOH. She is a vegetarian. She has h/o uterine fibroid and heavy menstrual cycles. She has seen GYN in the past for IVF. She has one son. She states her mother and two aunts have adult onset musculodystrophy. Dr. Bowling has not had any genetic counseling/testing for this. No worrisome skin lesions. No other early family h/o colon cancer or breast cancer. She has some friends who have had early breast cancer. She has some fatigue. She has L upper sacroiliac pain for several months w/o L sided radicular sxs. She has seen a chiropractor for this and still has sxs. She does not feel NSAIDS or acetaminophen have been effective for this pain. She has not lost weight. She states chronic lower cervical pain and at night she will get B arm numbness that wakes her up. No radicular sxs. During the day. No bowel/bladder dysfunction. She denies any B arm weakness. No B hand pain. 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 No worrisome skin issues. No other HEENT, cardiopulmonary, abdominal, , GYN, neurological, systemic, psychiatric, lymphatic, endocrine, vascular complaints.     Past Medical History:   Diagnosis Date     NO ACTIVE PROBLEMS      Past Surgical History:   Procedure Laterality Date     C/SECTION, LOW TRANSVERSE  01/16/2014     PE:    Vitals noted, gen, nad, cooperative, alert, neck with tenderness lower posterior spine, no masses, no adenopathy, full ROM, lungs with good air movement, RRR, S1, S2, no MRG, abdomen, no acute findings. She has some minor tenderness L upper sacroiliac area. No masses. She has good muscle strength and tone both UE and LE.     A/P:    1. Immunizations; Pfizer COVID vaccine x 3. Influenza vaccine 9/15/2021. Tdap today 2/2/2022  2. Negative COVID testing 1/4/2022  3. For fatigue and h/o heavy menstrual cycles. Labs today including CBC, iron studies and B12. Ordered Vit D and  TSH  4. Plain pelvic X-rays and follow up if worse consider orthopedic evaluation and/or additional MRI imaging  5. GYN referral for heavy menstrual cycles and h/o fibroid   6. MRI cervical spine for neck and night time B arm numbness complaints.   7. Ordered lipids

## 2022-02-02 ENCOUNTER — ANCILLARY PROCEDURE (OUTPATIENT)
Dept: GENERAL RADIOLOGY | Facility: CLINIC | Age: 42
End: 2022-02-02
Attending: INTERNAL MEDICINE
Payer: COMMERCIAL

## 2022-02-02 ENCOUNTER — OFFICE VISIT (OUTPATIENT)
Dept: INTERNAL MEDICINE | Facility: CLINIC | Age: 42
End: 2022-02-02
Payer: COMMERCIAL

## 2022-02-02 ENCOUNTER — LAB (OUTPATIENT)
Dept: LAB | Facility: CLINIC | Age: 42
End: 2022-02-02
Payer: COMMERCIAL

## 2022-02-02 VITALS
BODY MASS INDEX: 21.47 KG/M2 | HEART RATE: 91 BPM | HEIGHT: 67 IN | DIASTOLIC BLOOD PRESSURE: 69 MMHG | WEIGHT: 136.8 LBS | OXYGEN SATURATION: 97 % | SYSTOLIC BLOOD PRESSURE: 119 MMHG

## 2022-02-02 DIAGNOSIS — N92.1 MENORRHAGIA WITH IRREGULAR CYCLE: Primary | ICD-10-CM

## 2022-02-02 DIAGNOSIS — R53.83 OTHER FATIGUE: ICD-10-CM

## 2022-02-02 DIAGNOSIS — N92.1 MENORRHAGIA WITH IRREGULAR CYCLE: ICD-10-CM

## 2022-02-02 DIAGNOSIS — Z23 NEED FOR VACCINATION: ICD-10-CM

## 2022-02-02 LAB
ALBUMIN SERPL-MCNC: 4 G/DL (ref 3.4–5)
ALP SERPL-CCNC: 65 U/L (ref 40–150)
ALT SERPL W P-5'-P-CCNC: 21 U/L (ref 0–50)
ANION GAP SERPL CALCULATED.3IONS-SCNC: 7 MMOL/L (ref 3–14)
AST SERPL W P-5'-P-CCNC: 21 U/L (ref 0–45)
BASOPHILS # BLD AUTO: 0 10E3/UL (ref 0–0.2)
BASOPHILS NFR BLD AUTO: 1 %
BILIRUB SERPL-MCNC: 0.2 MG/DL (ref 0.2–1.3)
BUN SERPL-MCNC: 14 MG/DL (ref 7–30)
CALCIUM SERPL-MCNC: 10 MG/DL (ref 8.5–10.1)
CHLORIDE BLD-SCNC: 105 MMOL/L (ref 94–109)
CHOLEST SERPL-MCNC: 209 MG/DL
CO2 SERPL-SCNC: 28 MMOL/L (ref 20–32)
CREAT SERPL-MCNC: 0.69 MG/DL (ref 0.52–1.04)
EOSINOPHIL # BLD AUTO: 0.3 10E3/UL (ref 0–0.7)
EOSINOPHIL NFR BLD AUTO: 3 %
ERYTHROCYTE [DISTWIDTH] IN BLOOD BY AUTOMATED COUNT: 13.2 % (ref 10–15)
FASTING STATUS PATIENT QL REPORTED: NO
FERRITIN SERPL-MCNC: 7 NG/ML (ref 12–150)
FOLATE SERPL-MCNC: 21.9 NG/ML
GFR SERPL CREATININE-BSD FRML MDRD: >90 ML/MIN/1.73M2
GLUCOSE BLD-MCNC: 87 MG/DL (ref 70–99)
HCT VFR BLD AUTO: 38.7 % (ref 35–47)
HDLC SERPL-MCNC: 49 MG/DL
HGB BLD-MCNC: 12 G/DL (ref 11.7–15.7)
IMM GRANULOCYTES # BLD: 0 10E3/UL
IMM GRANULOCYTES NFR BLD: 0 %
IRON SATN MFR SERPL: 9 % (ref 15–46)
IRON SERPL-MCNC: 42 UG/DL (ref 35–180)
LDLC SERPL CALC-MCNC: 106 MG/DL
LYMPHOCYTES # BLD AUTO: 2.4 10E3/UL (ref 0.8–5.3)
LYMPHOCYTES NFR BLD AUTO: 28 %
MCH RBC QN AUTO: 26.4 PG (ref 26.5–33)
MCHC RBC AUTO-ENTMCNC: 31 G/DL (ref 31.5–36.5)
MCV RBC AUTO: 85 FL (ref 78–100)
MONOCYTES # BLD AUTO: 0.8 10E3/UL (ref 0–1.3)
MONOCYTES NFR BLD AUTO: 9 %
NEUTROPHILS # BLD AUTO: 5 10E3/UL (ref 1.6–8.3)
NEUTROPHILS NFR BLD AUTO: 59 %
NONHDLC SERPL-MCNC: 160 MG/DL
NRBC # BLD AUTO: 0 10E3/UL
NRBC BLD AUTO-RTO: 0 /100
PLATELET # BLD AUTO: 284 10E3/UL (ref 150–450)
POTASSIUM BLD-SCNC: 3.9 MMOL/L (ref 3.4–5.3)
PROT SERPL-MCNC: 8 G/DL (ref 6.8–8.8)
RBC # BLD AUTO: 4.55 10E6/UL (ref 3.8–5.2)
SODIUM SERPL-SCNC: 140 MMOL/L (ref 133–144)
TIBC SERPL-MCNC: 485 UG/DL (ref 240–430)
TRIGL SERPL-MCNC: 270 MG/DL
TSH SERPL DL<=0.005 MIU/L-ACNC: 1.07 MU/L (ref 0.4–4)
VIT B12 SERPL-MCNC: 621 PG/ML (ref 193–986)
WBC # BLD AUTO: 8.5 10E3/UL (ref 4–11)

## 2022-02-02 PROCEDURE — 73523 X-RAY EXAM HIPS BI 5/> VIEWS: CPT | Performed by: RADIOLOGY

## 2022-02-02 PROCEDURE — 83550 IRON BINDING TEST: CPT | Performed by: PATHOLOGY

## 2022-02-02 PROCEDURE — 90715 TDAP VACCINE 7 YRS/> IM: CPT | Performed by: INTERNAL MEDICINE

## 2022-02-02 PROCEDURE — 99386 PREV VISIT NEW AGE 40-64: CPT | Mod: 25 | Performed by: INTERNAL MEDICINE

## 2022-02-02 PROCEDURE — 80050 GENERAL HEALTH PANEL: CPT | Performed by: PATHOLOGY

## 2022-02-02 PROCEDURE — 90471 IMMUNIZATION ADMIN: CPT | Performed by: INTERNAL MEDICINE

## 2022-02-02 PROCEDURE — 82728 ASSAY OF FERRITIN: CPT | Performed by: PATHOLOGY

## 2022-02-02 PROCEDURE — 82746 ASSAY OF FOLIC ACID SERUM: CPT | Mod: 90 | Performed by: PATHOLOGY

## 2022-02-02 PROCEDURE — 82607 VITAMIN B-12: CPT | Performed by: PATHOLOGY

## 2022-02-02 PROCEDURE — 99000 SPECIMEN HANDLING OFFICE-LAB: CPT | Performed by: PATHOLOGY

## 2022-02-02 PROCEDURE — 36415 COLL VENOUS BLD VENIPUNCTURE: CPT | Performed by: PATHOLOGY

## 2022-02-02 PROCEDURE — 80061 LIPID PANEL: CPT | Performed by: PATHOLOGY

## 2022-02-02 ASSESSMENT — PAIN SCALES - GENERAL: PAINLEVEL: NO PAIN (0)

## 2022-02-02 ASSESSMENT — MIFFLIN-ST. JEOR: SCORE: 1313.15

## 2022-02-02 NOTE — NURSING NOTE
Chief Complaint   Patient presents with     Recheck Medication     Physical       SUE Mcdermott at 3:05 PM on 2/2/2022.

## 2022-02-03 ENCOUNTER — TELEPHONE (OUTPATIENT)
Dept: CONSULT | Facility: CLINIC | Age: 42
End: 2022-02-03
Payer: COMMERCIAL

## 2022-02-03 ENCOUNTER — TELEPHONE (OUTPATIENT)
Dept: INTERNAL MEDICINE | Facility: CLINIC | Age: 42
End: 2022-02-03
Payer: COMMERCIAL

## 2022-02-04 ENCOUNTER — TELEPHONE (OUTPATIENT)
Dept: INTERNAL MEDICINE | Facility: CLINIC | Age: 42
End: 2022-02-04

## 2022-02-04 ENCOUNTER — TELEPHONE (OUTPATIENT)
Dept: OBGYN | Facility: CLINIC | Age: 42
End: 2022-02-04
Payer: COMMERCIAL

## 2022-02-04 DIAGNOSIS — D25.1 INTRAMURAL LEIOMYOMA OF UTERUS: Primary | ICD-10-CM

## 2022-02-04 DIAGNOSIS — M25.552 HIP PAIN, LEFT: Primary | ICD-10-CM

## 2022-02-04 NOTE — TELEPHONE ENCOUNTER
Placed future orthopedic referral    FAITH Crawford      Dear Dr. Bowling;    Your pelvic X-rays show some subtle findings and I recommend if your symptoms persist that you see the orthopedic providers. I placed a referral today and you can call [phone] to schedule this appointment. I also recommend you keep your 5/5/2022 follow up appointment with me to go over all your tests    FAITH Crawford MD

## 2022-02-04 NOTE — TELEPHONE ENCOUNTER
Health Call Center    Phone Message    May a detailed message be left on voicemail: yes     Reason for Call: Appointment Intake      Referring Provider Name: Reji Crawford MD in [facility] [place] INTERNAL MEDICINE    Diagnosis and/or Symptoms: Menorrhagia with irregular cycle     -Direct referral to Dr. 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 Miranda     -Sending to see if there are any other possible openings for Sakina to be seen on - Thursday PM's are first choice, and Tuesday AM's would work as well. Thank you!     -Writer let patient know she would receive a phone call with further help / scheduling.     Action Taken: Message routed to:  Other: Women's Clinic / OBGYN    Travel Screening: Not Applicable

## 2022-02-12 ENCOUNTER — TELEPHONE (OUTPATIENT)
Dept: INTERNAL MEDICINE | Facility: CLINIC | Age: 42
End: 2022-02-12

## 2022-02-12 DIAGNOSIS — D64.9 ANEMIA, UNSPECIFIED TYPE: Primary | ICD-10-CM

## 2022-02-12 RX ORDER — FERROUS SULFATE 325(65) MG
325 TABLET ORAL
Qty: 90 TABLET | Refills: 1 | Status: SHIPPED | OUTPATIENT
Start: 2022-02-12 | End: 2022-02-17

## 2022-02-12 NOTE — TELEPHONE ENCOUNTER
Placed future orders this encounter    Dear Dr. Bowling;    Your iron and ferritin are low and I recommend you start on some iron and I sent a prescription in for you. I also recommend you keep your 3/3/2022 GYN appointment. I placed future lab orders to recheck you CBC, and iron studies in about 2 months and we can discuss on 5/5/2020 when you see me next    FAITH Crawford MD

## 2022-02-15 ENCOUNTER — MYC MEDICAL ADVICE (OUTPATIENT)
Dept: INTERNAL MEDICINE | Facility: CLINIC | Age: 42
End: 2022-02-15
Payer: COMMERCIAL

## 2022-02-15 DIAGNOSIS — Z20.822 SUSPECTED COVID-19 VIRUS INFECTION: Primary | ICD-10-CM

## 2022-02-15 DIAGNOSIS — D64.9 ANEMIA, UNSPECIFIED TYPE: ICD-10-CM

## 2022-02-15 NOTE — TELEPHONE ENCOUNTER
Order placed.  Tried to reach [facility] but received voicemail.  Left message to call back to confirm 130 US on 3/3/22.

## 2022-02-16 ENCOUNTER — LAB (OUTPATIENT)
Dept: LAB | Facility: CLINIC | Age: 42
End: 2022-02-16
Payer: COMMERCIAL

## 2022-02-16 DIAGNOSIS — Z20.822 SUSPECTED COVID-19 VIRUS INFECTION: ICD-10-CM

## 2022-02-16 LAB — SARS-COV-2 RNA RESP QL NAA+PROBE: NEGATIVE

## 2022-02-16 PROCEDURE — U0003 INFECTIOUS AGENT DETECTION BY NUCLEIC ACID (DNA OR RNA); SEVERE ACUTE RESPIRATORY SYNDROME CORONAVIRUS 2 (SARS-COV-2) (CORONAVIRUS DISEASE [COVID-19]), AMPLIFIED PROBE TECHNIQUE, MAKING USE OF HIGH THROUGHPUT TECHNOLOGIES AS DESCRIBED BY CMS-2020-01-R: HCPCS | Mod: 90 | Performed by: PATHOLOGY

## 2022-02-16 PROCEDURE — U0005 INFEC AGEN DETEC AMPLI PROBE: HCPCS | Mod: 90 | Performed by: PATHOLOGY

## 2022-02-16 PROCEDURE — 99000 SPECIMEN HANDLING OFFICE-LAB: CPT | Performed by: PATHOLOGY

## 2022-02-17 RX ORDER — FERROUS SULFATE 325(65) MG
325 TABLET ORAL
Qty: 90 TABLET | Refills: 1 | Status: ON HOLD | OUTPATIENT
Start: 2022-02-17 | End: 2024-01-12

## 2022-02-17 NOTE — TELEPHONE ENCOUNTER
(1) Sent in Rx. For iron    (2) As far as cholesterol; I'm not sure the cholesterol sample was fasting? I would work on exercise/diet and we can discuss and recheck on 5/5/2022 with Dr. Bowling's next visit with me    Thanks, FAITH Crawford

## 2022-03-03 ENCOUNTER — ANCILLARY PROCEDURE (OUTPATIENT)
Dept: ULTRASOUND IMAGING | Facility: CLINIC | Age: 42
End: 2022-03-03
Attending: OBSTETRICS & GYNECOLOGY
Payer: COMMERCIAL

## 2022-03-03 ENCOUNTER — TELEPHONE (OUTPATIENT)
Dept: OBGYN | Facility: CLINIC | Age: 42
End: 2022-03-03
Payer: COMMERCIAL

## 2022-03-03 ENCOUNTER — OFFICE VISIT (OUTPATIENT)
Dept: OBGYN | Facility: CLINIC | Age: 42
End: 2022-03-03
Attending: OBSTETRICS & GYNECOLOGY
Payer: COMMERCIAL

## 2022-03-03 VITALS
DIASTOLIC BLOOD PRESSURE: 65 MMHG | SYSTOLIC BLOOD PRESSURE: 103 MMHG | HEART RATE: 73 BPM | HEIGHT: 67 IN | WEIGHT: 135.4 LBS | BODY MASS INDEX: 21.25 KG/M2

## 2022-03-03 DIAGNOSIS — D25.0 INTRAMURAL, SUBMUCOUS, AND SUBSEROUS LEIOMYOMA OF UTERUS: Primary | ICD-10-CM

## 2022-03-03 DIAGNOSIS — D25.2 INTRAMURAL, SUBMUCOUS, AND SUBSEROUS LEIOMYOMA OF UTERUS: Primary | ICD-10-CM

## 2022-03-03 DIAGNOSIS — N93.9 ABNORMAL UTERINE BLEEDING (AUB): ICD-10-CM

## 2022-03-03 DIAGNOSIS — D25.1 INTRAMURAL, SUBMUCOUS, AND SUBSEROUS LEIOMYOMA OF UTERUS: Primary | ICD-10-CM

## 2022-03-03 DIAGNOSIS — Z12.31 ENCOUNTER FOR SCREENING MAMMOGRAM FOR BREAST CANCER: ICD-10-CM

## 2022-03-03 DIAGNOSIS — D25.1 INTRAMURAL LEIOMYOMA OF UTERUS: ICD-10-CM

## 2022-03-03 PROCEDURE — 76830 TRANSVAGINAL US NON-OB: CPT

## 2022-03-03 PROCEDURE — 99203 OFFICE O/P NEW LOW 30 MIN: CPT | Performed by: OBSTETRICS & GYNECOLOGY

## 2022-03-03 PROCEDURE — G0463 HOSPITAL OUTPT CLINIC VISIT: HCPCS | Mod: 25

## 2022-03-03 PROCEDURE — 76830 TRANSVAGINAL US NON-OB: CPT | Mod: 26 | Performed by: OBSTETRICS & GYNECOLOGY

## 2022-03-03 ASSESSMENT — PATIENT HEALTH QUESTIONNAIRE - PHQ9: 5. POOR APPETITE OR OVEREATING: NOT AT ALL

## 2022-03-03 ASSESSMENT — ANXIETY QUESTIONNAIRES
2. NOT BEING ABLE TO STOP OR CONTROL WORRYING: NOT AT ALL
GAD7 TOTAL SCORE: 0
6. BECOMING EASILY ANNOYED OR IRRITABLE: NOT AT ALL
3. WORRYING TOO MUCH ABOUT DIFFERENT THINGS: NOT AT ALL
5. BEING SO RESTLESS THAT IT IS HARD TO SIT STILL: NOT AT ALL
1. FEELING NERVOUS, ANXIOUS, OR ON EDGE: NOT AT ALL
7. FEELING AFRAID AS IF SOMETHING AWFUL MIGHT HAPPEN: NOT AT ALL

## 2022-03-03 NOTE — LETTER
3/3/2022       RE: Sakina Bowling  [address]  [place] [address]     Dear Colleague,    Thank you for referring your patient, Sakina Bowling, to the [facility] [place] at [facility]. Please see a copy of my visit note below.    CC/HPI:   Sakina Bowling is a 42 year old female  who presents today to discuss abnormal bleeding and uterine fibroid.  She had an ultrasound prior to her visit.     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  She has heavy bleeding for 2-3 days of her cycle, soaking through a pad in 2-3 hours.  Can soak through to her clothes when in the OR for a long case.  She had been considering another pregnancy when we discussed myomectomy in 2020.  She at that time decided she didn't emotionally feel up to another round of IVF especially after a recent miscarriage.  She was not having other GYN issues at that time so elected not to pursue myomectomy.    Now she returns after having established care with Dr. Crawford.  They discussed her heavy bleeding and her recommended follow-up with GYN.  Her hemoglobin is normal; however, ferritin and iron indices jerrell c/w iron deficiency.  She still has occasional thoughts about future pregnancy, although her son is now 8. She does not urinary frequency and nocturia.  She is up 1-2 times every night and this is bothersome.  She would like to discuss treatment options.    HISTORIES:  Patient Active Problem List   Diagnosis     Intramural leiomyoma of uterus- appears transmural with impact on endometrium.     Past Medical History:   Diagnosis Date     NO ACTIVE PROBLEMS      Past Surgical History:   Procedure Laterality Date     C/SECTION, LOW TRANSVERSE  01/16/2014     Current Outpatient Medications   Medication Sig Dispense Refill     ferrous sulfate (FEROSUL) 325 (65 Fe) MG tablet Take 1 tablet (325 mg) by mouth daily (with breakfast) (Patient not taking: Reported on  3/3/2022) 90 tablet 1     Allergies   Allergen Reactions     Bactrim [Sulfamethoxazole W/Trimethoprim] Rash     Social History     Socioeconomic History     Marital status:      Spouse name: Not on file     Number of children: Not on file     Years of education: Not on file     Highest education level: Not on file   Occupational History     Not on file   Tobacco Use     Smoking status: Never Smoker     Smokeless tobacco: Never Used   Substance and Sexual Activity     Alcohol use: Yes     Comment: 1 every two months     Drug use: No     Sexual activity: Yes     Partners: Male   Other Topics Concern     Not on file   Social History Narrative    How much exercise per week? Walking daily    How much calcium per day? In dairy       How much caffeine per day? 2 cups  tea    How much vitamin D per day? Sun light and foods    Do you/your family wear seatbelts?  Yes    Do you/your family use safety helmets? Yes    Do you/your family use sunscreen? Yes    Do you/your family keep firearms in the home? No    Do you/your family have a smoke detector(s)? Yes        Reviewed cmckim lpn 2018                  Social Determinants of Health     Financial Resource Strain: Not on file   Food Insecurity: Not on file   Transportation Needs: Not on file   Physical Activity: Not on file   Stress: Not on file   Social Connections: Not on file   Intimate Partner Violence: Not on file   Housing Stability: Not on file     Family History   Problem Relation Age of Onset     Diabetes Mother      Hypertension Mother      Muscular Dystrophy Mother           Gyn Hx:   No LMP recorded.  Menses:   As above, no history of abnormal pap smears.    Review Of Systems:  As per hpi.  Additionally notes fatigue especially around time of menses. Some emotional concerns regarding fertility/family planning.  She does have some pain along her prior  incision, on the right.  It is not cyclic, no mass.  Remainder of 10 point review is  "negative.    EXAM:  /65 (BP Location: Right arm, Patient Position: Chair)   Pulse 73   Ht 1.702 m (5' 7\")   Wt 61.4 kg (135 lb 6.4 oz)   BMI 21.21 kg/m    Body mass index is 21.21 kg/m .    General - pleasant female in no acute distress.  Abdomen - soft, nontender, nondistended, no masses palpable on abdominal exam.   scar is well healed, no mass, non-tender.  Musculoskeletal - no gross deformities.    Pelvic - EG: normal  female, vulva reveals no erythema or lesions.   BUS: within normal limits.  Vagina: well rugated, menstrual blood in vault, speculum exam is very uncomfortable.     Cervix: not visualized, palpates normal  Uterus: firm, anteverted and anteflexed, 14 week size and nontender.  Adnexa: no masses or tenderness.  Anus- normal  Rectovaginal - deferred.      ASSESSMENT/PLAN  Transmural myoma and AUB-L  Iron deficiency  Urinary Frequency    Plan:   Discussed bleeding and options for management.  We discussed that if she is not considering pregnancy this opens other treatment options.  We also discussed the RF ablation of fibroids.  We reviewed the risks of surgery and the effect on future pregnancies, including delivery at 36-37 weeks by  and risks of placental abnormalities.    We reviewed medical treatments to manage the bleeding including OCPs, progestins and Mirena IUD.    After our discussion she is predominately considering robotic myomectomy. We reviewed recovery following surgery.  She will discuss with her  and make a final decision.    Recommend endometrial biopsy given AUB after age 40.  Will plan in OR given discomfort with exam.    (Z12.31) Encounter for screening mammogram for breast cancer  Comment: Patient wonders about when to start screening. Reviewed recommendations.  Plan: MA Screening Digital Bilateral             Meena Miranda MD     "

## 2022-03-03 NOTE — TELEPHONE ENCOUNTER
Returned call to patient after receiving VM. She wanted to confirm that transvaginal US can still be completed today as she is on day 5 of her cycle and still having some bleeding. Also wants to make sure she's seeing Dr. Miranda as the visit is appearing as a nurse visit on her end.     Confirmed she will be seeing Dr. Miranda and will be able to have TV US done. No further questions.

## 2022-03-03 NOTE — PROGRESS NOTES
CC/HPI:   Sakina Bowling is a 42 year old female  who presents today to discuss abnormal bleeding and uterine fibroid.  She had an ultrasound prior to her visit.     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  She has heavy bleeding for 2-3 days of her cycle, soaking through a pad in 2-3 hours.  Can soak through to her clothes when in the OR for a long case.  She had been considering another pregnancy when we discussed myomectomy in 2020.  She at that time decided she didn't emotionally feel up to another round of IVF especially after a recent miscarriage.  She was not having other GYN issues at that time so elected not to pursue myomectomy.    Now she returns after having established care with Dr. Crawford.  They discussed her heavy bleeding and her recommended follow-up with GYN.  Her hemoglobin is normal; however, ferritin and iron indices jerrell c/w iron deficiency.  She still has occasional thoughts about future pregnancy, although her son is now 8. She does not urinary frequency and nocturia.  She is up 1-2 times every night and this is bothersome.  She would like to discuss treatment options.    HISTORIES:  Patient Active Problem List   Diagnosis     Intramural leiomyoma of uterus- appears transmural with impact on endometrium.     Past Medical History:   Diagnosis Date     NO ACTIVE PROBLEMS      Past Surgical History:   Procedure Laterality Date     C/SECTION, LOW TRANSVERSE  01/16/2014     Current Outpatient Medications   Medication Sig Dispense Refill     ferrous sulfate (FEROSUL) 325 (65 Fe) MG tablet Take 1 tablet (325 mg) by mouth daily (with breakfast) (Patient not taking: Reported on 3/3/2022) 90 tablet 1     Allergies   Allergen Reactions     Bactrim [Sulfamethoxazole W/Trimethoprim] Rash     Social History     Socioeconomic History     Marital status:      Spouse name: Not on file     Number of children: Not on file     Years of education: Not on file     Highest education  "level: Not on file   Occupational History     Not on file   Tobacco Use     Smoking status: Never Smoker     Smokeless tobacco: Never Used   Substance and Sexual Activity     Alcohol use: Yes     Comment: 1 every two months     Drug use: No     Sexual activity: Yes     Partners: Male   Other Topics Concern     Not on file   Social History Narrative    How much exercise per week? Walking daily    How much calcium per day? In dairy       How much caffeine per day? 2 cups  tea    How much vitamin D per day? Sun light and foods    Do you/your family wear seatbelts?  Yes    Do you/your family use safety helmets? Yes    Do you/your family use sunscreen? Yes    Do you/your family keep firearms in the home? No    Do you/your family have a smoke detector(s)? Yes        Reviewed cmckim lpn 2018                  Social Determinants of Health     Financial Resource Strain: Not on file   Food Insecurity: Not on file   Transportation Needs: Not on file   Physical Activity: Not on file   Stress: Not on file   Social Connections: Not on file   Intimate Partner Violence: Not on file   Housing Stability: Not on file     Family History   Problem Relation Age of Onset     Diabetes Mother      Hypertension Mother      Muscular Dystrophy Mother           Gyn Hx:   No LMP recorded.  Menses:   As above, no history of abnormal pap smears.    Review Of Systems:  As per hpi.  Additionally notes fatigue especially around time of menses. Some emotional concerns regarding fertility/family planning.  She does have some pain along her prior  incision, on the right.  It is not cyclic, no mass.  Remainder of 10 point review is negative.    EXAM:  /65 (BP Location: Right arm, Patient Position: Chair)   Pulse 73   Ht 1.702 m (5' 7\")   Wt 61.4 kg (135 lb 6.4 oz)   BMI 21.21 kg/m    Body mass index is 21.21 kg/m .    General - pleasant female in no acute distress.  Abdomen - soft, nontender, nondistended, no masses palpable on " abdominal exam.   scar is well healed, no mass, non-tender.  Musculoskeletal - no gross deformities.    Pelvic - EG: normal  female, vulva reveals no erythema or lesions.   BUS: within normal limits.  Vagina: well rugated, menstrual blood in vault, speculum exam is very uncomfortable.     Cervix: not visualized, palpates normal  Uterus: firm, anteverted and anteflexed, 14 week size and nontender.  Adnexa: no masses or tenderness.  Anus- normal  Rectovaginal - deferred.      ASSESSMENT/PLAN  Transmural myoma and AUB-L  Iron deficiency  Urinary Frequency    Plan:   Discussed bleeding and options for management.  We discussed that if she is not considering pregnancy this opens other treatment options.  We also discussed the RF ablation of fibroids.  We reviewed the risks of surgery and the effect on future pregnancies, including delivery at 36-37 weeks by  and risks of placental abnormalities.    We reviewed medical treatments to manage the bleeding including OCPs, progestins and Mirena IUD.    After our discussion she is predominately considering robotic myomectomy. We reviewed recovery following surgery.  She will discuss with her  and make a final decision.    Recommend endometrial biopsy given AUB after age 40.  Will plan in OR given discomfort with exam.    (Z12.31) Encounter for screening mammogram for breast cancer  Comment: Patient wonders about when to start screening. Reviewed recommendations.  Plan: MA Screening Digital Bilateral             Meena Miranda MD

## 2022-03-04 ASSESSMENT — ANXIETY QUESTIONNAIRES: GAD7 TOTAL SCORE: 0

## 2022-03-24 ENCOUNTER — ANCILLARY PROCEDURE (OUTPATIENT)
Dept: MAMMOGRAPHY | Facility: CLINIC | Age: 42
End: 2022-03-24
Attending: OBSTETRICS & GYNECOLOGY
Payer: COMMERCIAL

## 2022-03-24 ENCOUNTER — LAB (OUTPATIENT)
Dept: URGENT CARE | Facility: URGENT CARE | Age: 42
End: 2022-03-24
Payer: COMMERCIAL

## 2022-03-24 DIAGNOSIS — Z12.31 ENCOUNTER FOR SCREENING MAMMOGRAM FOR BREAST CANCER: ICD-10-CM

## 2022-03-24 DIAGNOSIS — Z20.822 ENCOUNTER FOR LABORATORY TESTING FOR COVID-19 VIRUS: ICD-10-CM

## 2022-03-24 LAB — SARS-COV-2 RNA RESP QL NAA+PROBE: NEGATIVE

## 2022-03-24 PROCEDURE — U0005 INFEC AGEN DETEC AMPLI PROBE: HCPCS

## 2022-03-24 PROCEDURE — U0003 INFECTIOUS AGENT DETECTION BY NUCLEIC ACID (DNA OR RNA); SEVERE ACUTE RESPIRATORY SYNDROME CORONAVIRUS 2 (SARS-COV-2) (CORONAVIRUS DISEASE [COVID-19]), AMPLIFIED PROBE TECHNIQUE, MAKING USE OF HIGH THROUGHPUT TECHNOLOGIES AS DESCRIBED BY CMS-2020-01-R: HCPCS

## 2022-03-24 PROCEDURE — 77063 BREAST TOMOSYNTHESIS BI: CPT | Mod: GC | Performed by: STUDENT IN AN ORGANIZED HEALTH CARE EDUCATION/TRAINING PROGRAM

## 2022-03-24 PROCEDURE — 77067 SCR MAMMO BI INCL CAD: CPT | Mod: GC | Performed by: STUDENT IN AN ORGANIZED HEALTH CARE EDUCATION/TRAINING PROGRAM

## 2022-04-20 ENCOUNTER — PREP FOR PROCEDURE (OUTPATIENT)
Dept: OBGYN | Facility: CLINIC | Age: 42
End: 2022-04-20
Payer: COMMERCIAL

## 2022-04-20 DIAGNOSIS — D25.9 UTERINE LEIOMYOMA, UNSPECIFIED LOCATION: Primary | ICD-10-CM

## 2022-04-20 RX ORDER — CEFAZOLIN SODIUM 2 G/100ML
2 INJECTION, SOLUTION INTRAVENOUS
Status: CANCELLED | OUTPATIENT
Start: 2022-04-20

## 2022-04-20 RX ORDER — ACETAMINOPHEN 325 MG/1
975 TABLET ORAL ONCE
Status: CANCELLED | OUTPATIENT
Start: 2022-04-20 | End: 2022-04-20

## 2022-04-20 RX ORDER — CEFAZOLIN SODIUM 2 G/100ML
2 INJECTION, SOLUTION INTRAVENOUS SEE ADMIN INSTRUCTIONS
Status: CANCELLED | OUTPATIENT
Start: 2022-04-20

## 2022-04-22 ENCOUNTER — TRANSCRIBE ORDERS (OUTPATIENT)
Dept: OBGYN | Facility: CLINIC | Age: 42
End: 2022-04-22
Payer: COMMERCIAL

## 2022-04-22 ENCOUNTER — HOSPITAL ENCOUNTER (OUTPATIENT)
Facility: CLINIC | Age: 42
End: 2022-04-22
Attending: OBSTETRICS & GYNECOLOGY | Admitting: OBSTETRICS & GYNECOLOGY
Payer: COMMERCIAL

## 2022-04-22 DIAGNOSIS — Z01.812 ENCOUNTER FOR PRE-OPERATIVE LABORATORY TESTING: Primary | ICD-10-CM

## 2022-05-05 ENCOUNTER — OFFICE VISIT (OUTPATIENT)
Dept: INTERNAL MEDICINE | Facility: CLINIC | Age: 42
End: 2022-05-05
Payer: COMMERCIAL

## 2022-05-05 VITALS
HEIGHT: 67 IN | DIASTOLIC BLOOD PRESSURE: 74 MMHG | HEART RATE: 72 BPM | OXYGEN SATURATION: 100 % | WEIGHT: 136 LBS | BODY MASS INDEX: 21.35 KG/M2 | RESPIRATION RATE: 16 BRPM | SYSTOLIC BLOOD PRESSURE: 117 MMHG

## 2022-05-05 DIAGNOSIS — R07.89 ATYPICAL CHEST PAIN: ICD-10-CM

## 2022-05-05 DIAGNOSIS — D64.9 ANEMIA, UNSPECIFIED TYPE: Primary | ICD-10-CM

## 2022-05-05 DIAGNOSIS — E78.00 HIGH BLOOD CHOLESTEROL: ICD-10-CM

## 2022-05-05 DIAGNOSIS — M25.552 HIP PAIN, LEFT: ICD-10-CM

## 2022-05-05 PROCEDURE — 99214 OFFICE O/P EST MOD 30 MIN: CPT | Mod: 25 | Performed by: INTERNAL MEDICINE

## 2022-05-05 PROCEDURE — 93000 ELECTROCARDIOGRAM COMPLETE: CPT | Performed by: INTERNAL MEDICINE

## 2022-05-05 ASSESSMENT — PAIN SCALES - GENERAL: PAINLEVEL: NO PAIN (0)

## 2022-05-05 NOTE — PROGRESS NOTES
HPI:    Last visit with us 2/2/2022. 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 Still with hip discomfort and B sometimes. She does not take acetaminophen nor NSAID'S. She has atypical non-exertional L sided chest and L sided neck sxs. Family h/o vascular/coronary artery disease. She does not smoke nor abuse EtOH. She has increased urinary frequency (from large uterine fibroid?) No other HEENT, cardiopulmonary, abdominal, , GYN, neurological, systemic, psychiatric, lymphatic, endocrine, vascular complaints.     Past Medical History:   Diagnosis Date     NO ACTIVE PROBLEMS      Past Surgical History:   Procedure Laterality Date     C/SECTION, LOW TRANSVERSE  01/16/2014     PE:    Vitals noted, gen, nad, cooperative, alert    EKG; SR at 67. No acute findings. No change from 2/5/2020    Resting echo 2/5/2020:    Interpretation Summary  Global and regional left ventricular function is normal with an EF of 60-65%.  Right ventricular function, chamber size, wall motion, and thickness are  normal.  The inferior vena cava is normal.  No pericardial effusion is present.          Left Ventricle  Global and regional left ventricular function is normal with an EF of 60-65%.  Left ventricular wall thickness is normal. Left ventricular size is normal.  Diastolic function not assessed due to tachycardia. No regional wall motion  abnormalities are seen.     Right Ventricle  Right ventricular function, chamber size, wall motion, and thickness are  normal.     Atria  Both atria appear normal. The atrial septum is intact as assessed by color  Doppler . Lipomatous infiltration of the interatrial septm is present.     Mitral Valve  The mitral valve is normal. Trace mitral insufficiency is present.        Aortic Valve  Aortic valve is normal in structure and function.     Tricuspid Valve  The tricuspid valve is normal. Trace to mild tricuspid insufficiency is  present. Pulmonary artery systolic pressure cannot be assessed.     Pulmonic  "Valve  The pulmonic valve is normal. Trace pulmonic insufficiency is present.     Vessels  The aorta root is normal. The pulmonary artery is normal. The inferior vena  cava is normal.     Pericardium  No pericardial effusion is present.        Compared to Previous Study  Previous study not available for comparison.        A/P:    1. Immunizations; Pfizer COVID vaccine x 3. Tdap 2/2/2022  2. See Dr. Miranda, GYN 3/3/2022 for abnormal uterine bleeding and fibroid. She has surgery scheduled for 6/24/2022 for myomectomy  3. Lipids , HDL 49 and TG's 270. Ordered future today   4. Low iron and ordered repeat labs today 5/5/2022 that she can get future   5. Mammogram 3/24/2022  6. Hip pain; she had plain X-rays 2/2/2022. Placed orthopedic referral to Specifically Dr. Kermit zuniga.   7. Atypical chest pain family h/o and upcoming surgery. EKG today and stress echo.    30 minutes spent on the date of the encounter doing chart review, history and exam, documentation and further activities as noted above \"exclusive of procedures and other billable interpretations  "

## 2022-05-05 NOTE — NURSING NOTE
Sakina Bowling is a 42 year old female patient that presents today in clinic for the following:    Chief Complaint   Patient presents with     RECHECK     Pt comes into clinic for follow up     The patient's allergies and medications were reviewed as noted. A set of vitals were recorded as noted without incident. The patient does not have any other questions for the provider.    Tamiko Correa, EMT at 8:02 AM on 5/5/2022

## 2022-05-06 LAB
ATRIAL RATE - MUSE: 67 BPM
DIASTOLIC BLOOD PRESSURE - MUSE: NORMAL MMHG
INTERPRETATION ECG - MUSE: NORMAL
P AXIS - MUSE: 72 DEGREES
PR INTERVAL - MUSE: 194 MS
QRS DURATION - MUSE: 76 MS
QT - MUSE: 376 MS
QTC - MUSE: 397 MS
R AXIS - MUSE: 70 DEGREES
SYSTOLIC BLOOD PRESSURE - MUSE: NORMAL MMHG
T AXIS - MUSE: 56 DEGREES
VENTRICULAR RATE- MUSE: 67 BPM

## 2022-05-19 ENCOUNTER — LAB (OUTPATIENT)
Dept: LAB | Facility: CLINIC | Age: 42
End: 2022-05-19
Payer: COMMERCIAL

## 2022-05-19 ENCOUNTER — TELEPHONE (OUTPATIENT)
Dept: INTERNAL MEDICINE | Facility: CLINIC | Age: 42
End: 2022-05-19

## 2022-05-19 DIAGNOSIS — Z01.812 ENCOUNTER FOR PRE-OPERATIVE LABORATORY TESTING: ICD-10-CM

## 2022-05-19 DIAGNOSIS — D50.8 OTHER IRON DEFICIENCY ANEMIA: Primary | ICD-10-CM

## 2022-05-19 DIAGNOSIS — E78.00 HIGH BLOOD CHOLESTEROL: ICD-10-CM

## 2022-05-19 DIAGNOSIS — D64.9 ANEMIA, UNSPECIFIED TYPE: ICD-10-CM

## 2022-05-19 LAB
BASOPHILS # BLD AUTO: 0 10E3/UL (ref 0–0.2)
BASOPHILS NFR BLD AUTO: 1 %
CHOLEST SERPL-MCNC: 194 MG/DL
EOSINOPHIL # BLD AUTO: 0.2 10E3/UL (ref 0–0.7)
EOSINOPHIL NFR BLD AUTO: 4 %
ERYTHROCYTE [DISTWIDTH] IN BLOOD BY AUTOMATED COUNT: 14.8 % (ref 10–15)
FASTING STATUS PATIENT QL REPORTED: YES
FERRITIN SERPL-MCNC: 12 NG/ML (ref 12–150)
HCT VFR BLD AUTO: 42.5 % (ref 35–47)
HDLC SERPL-MCNC: 60 MG/DL
HGB BLD-MCNC: 13.3 G/DL (ref 11.7–15.7)
IMM GRANULOCYTES # BLD: 0 10E3/UL
IMM GRANULOCYTES NFR BLD: 0 %
IRON SATN MFR SERPL: 51 % (ref 15–46)
IRON SERPL-MCNC: 218 UG/DL (ref 35–180)
LDLC SERPL CALC-MCNC: 106 MG/DL
LYMPHOCYTES # BLD AUTO: 1.8 10E3/UL (ref 0.8–5.3)
LYMPHOCYTES NFR BLD AUTO: 35 %
MCH RBC QN AUTO: 27.7 PG (ref 26.5–33)
MCHC RBC AUTO-ENTMCNC: 31.3 G/DL (ref 31.5–36.5)
MCV RBC AUTO: 89 FL (ref 78–100)
MONOCYTES # BLD AUTO: 0.4 10E3/UL (ref 0–1.3)
MONOCYTES NFR BLD AUTO: 8 %
NEUTROPHILS # BLD AUTO: 2.8 10E3/UL (ref 1.6–8.3)
NEUTROPHILS NFR BLD AUTO: 52 %
NONHDLC SERPL-MCNC: 134 MG/DL
NRBC # BLD AUTO: 0 10E3/UL
NRBC BLD AUTO-RTO: 0 /100
PLATELET # BLD AUTO: 277 10E3/UL (ref 150–450)
RBC # BLD AUTO: 4.8 10E6/UL (ref 3.8–5.2)
TIBC SERPL-MCNC: 429 UG/DL (ref 240–430)
TRIGL SERPL-MCNC: 138 MG/DL
WBC # BLD AUTO: 5.3 10E3/UL (ref 4–11)

## 2022-05-19 PROCEDURE — 85025 COMPLETE CBC W/AUTO DIFF WBC: CPT | Performed by: PATHOLOGY

## 2022-05-19 PROCEDURE — 36415 COLL VENOUS BLD VENIPUNCTURE: CPT | Performed by: PATHOLOGY

## 2022-05-19 PROCEDURE — 82728 ASSAY OF FERRITIN: CPT | Performed by: PATHOLOGY

## 2022-05-19 PROCEDURE — 83550 IRON BINDING TEST: CPT | Performed by: PATHOLOGY

## 2022-05-19 PROCEDURE — 80061 LIPID PANEL: CPT | Performed by: PATHOLOGY

## 2022-06-01 DIAGNOSIS — Z20.822 SUSPECTED COVID-19 VIRUS INFECTION: Primary | ICD-10-CM

## 2022-07-26 ENCOUNTER — OFFICE VISIT (OUTPATIENT)
Dept: OPTOMETRY | Facility: CLINIC | Age: 42
End: 2022-07-26
Payer: COMMERCIAL

## 2022-07-26 DIAGNOSIS — H10.32 ACUTE BACTERIAL CONJUNCTIVITIS OF LEFT EYE: Primary | ICD-10-CM

## 2022-07-26 RX ORDER — TOBRAMYCIN AND DEXAMETHASONE 3; 1 MG/ML; MG/ML
1-2 SUSPENSION/ DROPS OPHTHALMIC 3 TIMES DAILY
Qty: 2.5 ML | Refills: 1 | Status: ON HOLD | OUTPATIENT
Start: 2022-07-26 | End: 2024-01-12

## 2022-07-26 ASSESSMENT — VISUAL ACUITY
OD_SC: 20/20
METHOD: SNELLEN - LINEAR
OS_SC: 20/20-1

## 2022-07-26 ASSESSMENT — TONOMETRY
OD_IOP_MMHG: 09
OS_IOP_MMHG: 12
IOP_METHOD: ICARE

## 2022-07-26 ASSESSMENT — SLIT LAMP EXAM - LIDS: COMMENTS: NORMAL

## 2022-07-26 NOTE — PROGRESS NOTES
A/P  1.) Conjunctivitis left eye  -Acute onset yesterday, noticed swelling/discharge and blurred vision  -Son was sick recently with respiratory infection, had some eye issues as well  -Woke up this morning with eyelid crusted shut  -Likely viral conjunctivitis but given close patient care would rec antibiotic/steroid combo to resolve issues  -Tobradex tid x 5-7 days. Artificial tears prn for comfort or blurred vision    F/u prn if symptoms worsen or do not improve

## 2022-08-02 ENCOUNTER — HOSPITAL ENCOUNTER (OUTPATIENT)
Dept: CARDIOLOGY | Facility: CLINIC | Age: 42
Discharge: HOME | End: 2022-08-02
Attending: INTERNAL MEDICINE | Admitting: INTERNAL MEDICINE
Payer: COMMERCIAL

## 2022-08-02 DIAGNOSIS — D64.9 ANEMIA, UNSPECIFIED TYPE: ICD-10-CM

## 2022-08-02 DIAGNOSIS — R07.89 ATYPICAL CHEST PAIN: ICD-10-CM

## 2022-08-02 DIAGNOSIS — E78.00 HIGH BLOOD CHOLESTEROL: ICD-10-CM

## 2022-08-02 DIAGNOSIS — M25.552 HIP PAIN, LEFT: ICD-10-CM

## 2022-08-02 PROCEDURE — 255N000002 HC RX 255 OP 636: Performed by: INTERNAL MEDICINE

## 2022-08-02 PROCEDURE — 93017 CV STRESS TEST TRACING ONLY: CPT

## 2022-08-02 PROCEDURE — C8928 TTE W OR W/O FOL W/CON,STRES: HCPCS

## 2022-08-02 PROCEDURE — 93350 STRESS TTE ONLY: CPT | Mod: 26 | Performed by: INTERNAL MEDICINE

## 2022-08-02 PROCEDURE — 93325 DOPPLER ECHO COLOR FLOW MAPG: CPT | Mod: 26 | Performed by: INTERNAL MEDICINE

## 2022-08-02 PROCEDURE — 93016 CV STRESS TEST SUPVJ ONLY: CPT | Performed by: INTERNAL MEDICINE

## 2022-08-02 PROCEDURE — 93321 DOPPLER ECHO F-UP/LMTD STD: CPT | Mod: 26 | Performed by: INTERNAL MEDICINE

## 2022-08-02 PROCEDURE — 93018 CV STRESS TEST I&R ONLY: CPT | Performed by: INTERNAL MEDICINE

## 2022-08-02 RX ADMIN — PERFLUTREN 5 ML: 6.52 INJECTION, SUSPENSION INTRAVENOUS at 14:16

## 2022-09-17 ENCOUNTER — HEALTH MAINTENANCE LETTER (OUTPATIENT)
Age: 42
End: 2022-09-17

## 2022-12-05 ENCOUNTER — HOSPITAL ENCOUNTER (OUTPATIENT)
Facility: CLINIC | Age: 42
End: 2022-12-05
Attending: OBSTETRICS & GYNECOLOGY | Admitting: OBSTETRICS & GYNECOLOGY
Payer: COMMERCIAL

## 2022-12-29 ENCOUNTER — IMMUNIZATION (OUTPATIENT)
Dept: NURSING | Facility: CLINIC | Age: 42
End: 2022-12-29
Payer: COMMERCIAL

## 2022-12-29 PROCEDURE — 91312 COVID-19 VACCINE BIVALENT BOOSTER 12+ (PFIZER): CPT

## 2022-12-29 PROCEDURE — 0124A COVID-19 VACCINE BIVALENT BOOSTER 12+ (PFIZER): CPT

## 2023-05-03 ENCOUNTER — MYC MEDICAL ADVICE (OUTPATIENT)
Dept: INTERNAL MEDICINE | Facility: CLINIC | Age: 43
End: 2023-05-03
Payer: COMMERCIAL

## 2023-05-04 ENCOUNTER — MYC MEDICAL ADVICE (OUTPATIENT)
Dept: INTERNAL MEDICINE | Facility: CLINIC | Age: 43
End: 2023-05-04
Payer: COMMERCIAL

## 2023-05-04 DIAGNOSIS — K21.9 GASTROESOPHAGEAL REFLUX DISEASE WITHOUT ESOPHAGITIS: Primary | ICD-10-CM

## 2023-05-05 NOTE — TELEPHONE ENCOUNTER
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 I will talk to her about the GI referral    Thanks, FAITH Crawford

## 2023-05-06 ENCOUNTER — HEALTH MAINTENANCE LETTER (OUTPATIENT)
Age: 43
End: 2023-05-06

## 2023-05-18 ENCOUNTER — ANCILLARY PROCEDURE (OUTPATIENT)
Dept: MAMMOGRAPHY | Facility: CLINIC | Age: 43
End: 2023-05-18
Attending: INTERNAL MEDICINE
Payer: COMMERCIAL

## 2023-05-18 ENCOUNTER — OFFICE VISIT (OUTPATIENT)
Dept: INTERNAL MEDICINE | Facility: CLINIC | Age: 43
End: 2023-05-18
Payer: COMMERCIAL

## 2023-05-18 VITALS
DIASTOLIC BLOOD PRESSURE: 69 MMHG | HEIGHT: 67 IN | SYSTOLIC BLOOD PRESSURE: 114 MMHG | BODY MASS INDEX: 20.4 KG/M2 | HEART RATE: 66 BPM | WEIGHT: 130 LBS | OXYGEN SATURATION: 100 %

## 2023-05-18 DIAGNOSIS — Z12.31 VISIT FOR SCREENING MAMMOGRAM: ICD-10-CM

## 2023-05-18 DIAGNOSIS — E61.1 LOW IRON: Primary | ICD-10-CM

## 2023-05-18 PROCEDURE — 77067 SCR MAMMO BI INCL CAD: CPT | Mod: GC

## 2023-05-18 PROCEDURE — 99214 OFFICE O/P EST MOD 30 MIN: CPT | Performed by: INTERNAL MEDICINE

## 2023-05-18 PROCEDURE — 77063 BREAST TOMOSYNTHESIS BI: CPT | Mod: GC

## 2023-05-18 NOTE — PROGRESS NOTES
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 She some heart burn and reflux and worse if she eats later at night. She has taken PRN PPI with some relief. She has some non-specific anterior chest wall tenderness infrequently. She states her mother had CABG in her 60's and her aunt in her 50's. She does not smoke, no exertional CP/SOB. No other HEENT, cardiopulmonary, abdominal, , GYN, neurological, systemic, psychiatric, lymphatic, endocrine, vascular complaints.     Past Medical History:   Diagnosis Date     NO ACTIVE PROBLEMS      Past Surgical History:   Procedure Laterality Date     C/SECTION, LOW TRANSVERSE  01/16/2014     PE:    Vitals noted, gen, nad, cooperative, alert, neck supple nl rom, no B carotid bruits, lungs with good air movement, RRR, S1, S2, no MRG, minimal tenderness to palpation to anterior chest wall. Abdomen, no acute findings. Grossly normal neurological exam.     A/P:    1. Immunizations; Pfizer COVID vaccine x 4 (bi-valent 12/29/2022). Tdap 2/2/2022  2. See Dr. Miranda, GYN 3/3/2022 for abnormal uterine bleeding and fibroid. She was going to get  Surgery for myomectomy. She may follow up with Dr. Miranda regarding this issue.   3. Lipids; 5/19/2022; , HDL 60, TG's 138. Ordered lipids today.   4. Low iron; labs 5/19/2022; Ferritin 12 (range ), Iron 218, TIBC 429, and iron sat 51% Hgb 13.3, MCV 89 and RDW normal at 14.8%. Ordered labs today 5/18/2023.   5. Mammogram today 5/18/2023  6. Hip pain; she had plain X-rays 2/2/2022. Placed orthopedic referral to Specifically Dr. Kermit zuniga. No current sxs.   7. Atypical chest pain family h/o vascular disease. She had normal stress exercise echo test 8/2/2022. She still has a little non-exertional anterior chest wall tenderness more with palpation. We discussed CXR, Chest CT and/or further cardiac (CT calcium coronary scan and probably not CT coronary angiogram). For now will follow.   8. Ordered EGD 5/4/2023 for reflux sxs.   9.  Dermatology; she does not feel she needs to see dermatology for a skin cancer check       30 minutes spent on the date of the encounter doing chart review, history and exam, documentation and further activities as noted above exclusive of procedures and other billable interpretations

## 2023-05-18 NOTE — NURSING NOTE
Sakina Bowling is a 43 year old female patient that presents today in clinic for the following:    Chief Complaint   Patient presents with     RECHECK     Routine       The patient's allergies and medications were reviewed as noted. A set of vitals were recorded as noted without incident. The patient does not have any other questions for the provider.    Kishan Ku, EMT at 10:49 AM on 5/18/2023

## 2023-05-24 NOTE — TELEPHONE ENCOUNTER
Called Sakina to review recent referral to genetics. She was unavailable and a non-detailed VM with contact information was left. Requesting additional information regarding family history of muscular dystrophy prior to scheduling.    Ramandeep Danielle MS [facility]  Genetic Counselor  Email: [email or web address]  Phone: [phone]  Pager: [phone]    Addendum 3/11/2022  Called Sakina to review recent referral to genetics. She was unavailable and a non-detailed VM with contact information was left. Referral closed.  
CBC/CMP

## 2023-06-27 ENCOUNTER — TELEPHONE (OUTPATIENT)
Dept: GASTROENTEROLOGY | Facility: CLINIC | Age: 43
End: 2023-06-27
Payer: COMMERCIAL

## 2023-06-27 NOTE — TELEPHONE ENCOUNTER
"Endoscopy Scheduling Screen    Have you had a positive Covid test in the last 14 days?  No    Are you active on MyChart?   Yes    What insurance is in the chart?  Other:  [employer]    Ordering/Referring Provider: DAVID GALLO   (If ordering provider performs procedure, schedule with ordering provider unless otherwise instructed. )    BMI: Estimated body mass index is 20.36 kg/m  as calculated from the following:    Height as of 5/18/23: 1.702 m (5' 7.01\").    Weight as of 5/18/23: 59 kg (130 lb).     Sedation Ordered  moderate sedation.   If patient BMI > 50 do not schedule in [facility].    Are you taking any prescription medications for pain?   No    Are you taking methadone or Suboxone?  No    Do you have a history of malignant hyperthermia or adverse reaction to anesthesia?  No    (Females) Are you currently pregnant?   No     Have you been diagnosed or told you have pulmonary hypertension?   No    Do you have an LVAD?  No    Have you been told you have moderate to severe sleep apnea?  No    Have you been told you have COPD, asthma, or any other lung disease?  No    Do you have any heart conditions?  No     Have you ever had or are you awaiting a heart or lung transplant?   No    Have you had a stroke or transient ischemic attack (TIA aka \"mini stroke\" in the last 6 months?   No    Have you been diagnosed with or been told you have cirrhosis of the liver?   No    Are you currently on dialysis?   No    Do you need assistance transferring?   No    BMI: Estimated body mass index is 20.36 kg/m  as calculated from the following:    Height as of 5/18/23: 1.702 m (5' 7.01\").    Weight as of 5/18/23: 59 kg (130 lb).     Is patients BMI > 40 and scheduling location [facility]?  No    Do you take the medication Phentermine, Ozempic or Wegovy?  No    Do you take the medication Naltrexone?  No    Do you take blood thinners?  No      Prep   Are you currently on dialysis or do you have chronic kidney disease?  No    Do you have a " diagnosis of diabetes?  No    Do you have a diagnosis of cystic fibrosis (CF)?  No    On a regular basis do you go 3 -5 days between bowel movements?      BMI > 40?  No    Preferred Pharmacy:      [id number] - [place], [place] - [address] AT [facility] [address]  [address]  [facility] [address]  Phone: [phone] Fax: [phone]      Final Scheduling Details   Colonoscopy prep sent?      Procedure scheduled  Upper endoscopy (EGD)    Surgeon:       Date of procedure: GOING TO TALK TO EULOGIO ( SHE KNOWS HIM) TO SCHEDULE WITH HIM       Schedule PAC:   No    Location  [facility]    Sedation   Moderate Sedation    Patient Reminders:    You will receive a call from a Nurse to review instructions and health history.  This assessment must be completed prior to your procedure.  Failure to complete the Nurse assessment may result in the procedure being cancelled.       On the day of your procedure, please designate an adult(s) who can drive you home stay with you for the next 24 hours. The medicines used in the exam will make you sleepy. You will not be able to drive.       You cannot take public transportation, ride share services, or non-medical taxi service without a responsible caregiver.  Medical transport services are allowed with the requirement that a responsible caregiver will receive you at your destination.  We require that drivers and caregivers are confirmed prior to your procedure.

## 2023-06-29 ENCOUNTER — PATIENT OUTREACH (OUTPATIENT)
Dept: GASTROENTEROLOGY | Facility: CLINIC | Age: 43
End: 2023-06-29
Payer: COMMERCIAL

## 2023-06-29 NOTE — TELEPHONE ENCOUNTER
Called pt to discuss Dr Branham's request to schedule EGD at [facility] for reflux. Left VM    Procedure/Imaging/Clinic: EGD  Physician: Dr Branham  Timing: next available  Scope time needed: per MD average  Anesthesia: MAC  Dx: Gastroesophageal reflux disease without esophagitis  Tier: 3  Location: [facility]  Header of letter for pt communication:   Upper endoscopy    Aleisha Dorado, RN, BSN,   [facility]  Care coordinator

## 2023-07-25 NOTE — TELEPHONE ENCOUNTER
Left VM to call when available to schedule procedure. Added Dr Branham to list of care team members if pt would prefer to communicate that way.

## 2023-09-11 ENCOUNTER — ANCILLARY PROCEDURE (OUTPATIENT)
Dept: ULTRASOUND IMAGING | Facility: CLINIC | Age: 43
End: 2023-09-11
Attending: OBSTETRICS & GYNECOLOGY
Payer: COMMERCIAL

## 2023-09-11 DIAGNOSIS — D25.9 UTERINE LEIOMYOMA, UNSPECIFIED LOCATION: Primary | ICD-10-CM

## 2023-09-11 DIAGNOSIS — D25.9 UTERINE LEIOMYOMA, UNSPECIFIED LOCATION: ICD-10-CM

## 2023-09-11 PROCEDURE — 76830 TRANSVAGINAL US NON-OB: CPT | Mod: GC | Performed by: RADIOLOGY

## 2023-09-11 PROCEDURE — 76856 US EXAM PELVIC COMPLETE: CPT | Mod: GC | Performed by: RADIOLOGY

## 2023-09-14 ENCOUNTER — ALLIED HEALTH/NURSE VISIT (OUTPATIENT)
Dept: OBGYN | Facility: CLINIC | Age: 43
End: 2023-09-14
Attending: OBSTETRICS & GYNECOLOGY
Payer: COMMERCIAL

## 2023-09-14 VITALS
BODY MASS INDEX: 20.2 KG/M2 | SYSTOLIC BLOOD PRESSURE: 107 MMHG | WEIGHT: 129 LBS | DIASTOLIC BLOOD PRESSURE: 69 MMHG | HEART RATE: 85 BPM

## 2023-09-14 DIAGNOSIS — D25.9 UTERINE LEIOMYOMA, UNSPECIFIED LOCATION: Primary | ICD-10-CM

## 2023-09-14 DIAGNOSIS — L65.9 HAIR LOSS: ICD-10-CM

## 2023-09-14 DIAGNOSIS — N93.9 ABNORMAL UTERINE BLEEDING (AUB): ICD-10-CM

## 2023-09-14 DIAGNOSIS — Z12.4 SCREENING FOR CERVICAL CANCER: ICD-10-CM

## 2023-09-14 PROCEDURE — G0463 HOSPITAL OUTPT CLINIC VISIT: HCPCS | Performed by: OBSTETRICS & GYNECOLOGY

## 2023-09-14 PROCEDURE — G0145 SCR C/V CYTO,THINLAYER,RESCR: HCPCS | Performed by: OBSTETRICS & GYNECOLOGY

## 2023-09-14 PROCEDURE — 87624 HPV HI-RISK TYP POOLED RSLT: CPT | Performed by: OBSTETRICS & GYNECOLOGY

## 2023-09-14 PROCEDURE — 99214 OFFICE O/P EST MOD 30 MIN: CPT | Mod: GC | Performed by: OBSTETRICS & GYNECOLOGY

## 2023-09-14 RX ORDER — ACETAMINOPHEN 325 MG/1
975 TABLET ORAL ONCE
Status: CANCELLED | OUTPATIENT
Start: 2023-09-14 | End: 2023-09-14

## 2023-09-14 RX ORDER — NORGESTIMATE AND ETHINYL ESTRADIOL 0.25-0.035
1 KIT ORAL DAILY
Qty: 90 TABLET | Refills: 3 | Status: ON HOLD | OUTPATIENT
Start: 2023-09-14 | End: 2024-01-12

## 2023-09-14 RX ORDER — METRONIDAZOLE 500 MG/100ML
500 INJECTION, SOLUTION INTRAVENOUS ONCE
Status: DISCONTINUED | OUTPATIENT
Start: 2023-09-14 | End: 2023-09-14

## 2023-09-14 RX ORDER — TRANEXAMIC ACID 650 MG/1
1300 TABLET ORAL 3 TIMES DAILY
Qty: 60 TABLET | Refills: 1 | Status: ON HOLD | OUTPATIENT
Start: 2023-09-14 | End: 2024-01-12

## 2023-09-14 RX ORDER — PHENAZOPYRIDINE HYDROCHLORIDE 100 MG/1
200 TABLET, FILM COATED ORAL ONCE
Status: CANCELLED | OUTPATIENT
Start: 2023-09-14 | End: 2023-09-14

## 2023-09-14 NOTE — PATIENT INSTRUCTIONS
Thank you for trusting us with your care!     If you need to contact us for questions about:  Symptoms, Scheduling & Medical Questions; Non-urgent (2-3 day response) Paul message, Urgent (needing response today) [phone] (if after 3:30pm next day response)   Prescriptions: Please call your Pharmacy   Billing: [facility] [phone] or [facility]:[phone]

## 2023-09-14 NOTE — PROGRESS NOTES
[facility]  Gynecology Visit    Reason for Visit: Consult for surgical management of fibroids     HPI:    Sakina Bowling is a 43 year old , here for consultation for surgical management of fibroids. Patient was first noted to have fibroids on imaging in  when the largest measured 5x6 cm, and had discussed a possible myomectomy at that time as patient was planning for fertility. Patient did not conceive, and reports today that she is no longer planning on future fertility. Since that time, fibroids have grown in size, on most recent imaging on 23 now largest measuring 9.7 x 10.6 x 8.7 cm. The size of the uterus is 12.3 x 10.6 x 8.7 cm. Patient reports recent change in bleeding pattern, her most recent period was very heavy and lasted two weeks. She reports bulk symptoms that she notices at night, particularly bothersome is the frequency in urination. Endorses fatigue, hair loss, and pale skin, this worsened after last period. Denies syncope or dyspnea with exertion. 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 Patient concerned about potential that the rapid growth could indicate malignancy such as a sarcoma. Patient not currently using contraception. Travelling to [facility] and [facility] in November and December.     Patient has no history of DVT/PE, breast/ endometrial cancer, or migranes with aura.  Patient's  was available for visit via speaker phone.     Lab Results   Component Value Date    PAP NIL 2018     OBHx  OB History    Para Term  AB Living   1 1 1 0 0 1   SAB IAB Ectopic Multiple Live Births   0 0 0 0 0      # Outcome Date GA Lbr Vic/2nd Weight Sex Delivery Anes PTL Lv   1 Term 14 40w0d   M CS-LTranv         Complications: Failure to Progress in First Stage, Chorioamnionitis       Past Medical History:   Diagnosis Date    NO ACTIVE PROBLEMS        Past Surgical History:   Procedure Laterality Date    C/SECTION, LOW TRANSVERSE  2014         Current Outpatient  Medications:     norgestimate-ethinyl estradiol (ORTHO-CYCLEN) 0.25-35 MG-MCG tablet, Take 1 tablet by mouth daily, Disp: 90 tablet, Rfl: 3    tranexamic acid (LYSTEDA) 650 MG tablet, Take 2 tablets (1,300 mg) by mouth 3 times daily Take on days of heaviest bleeding, up to 5 days at a time., Disp: 60 tablet, Rfl: 1    ferrous sulfate (FEROSUL) 325 (65 Fe) MG tablet, Take 1 tablet (325 mg) by mouth daily (with breakfast), Disp: 90 tablet, Rfl: 1    tobramycin-dexamethasone (TOBRADEX) 0.3-0.1 % ophthalmic suspension, Place 1-2 drops Into the left eye 3 times daily, Disp: 2.5 mL, Rfl: 1    Allergies   Allergen Reactions    Bactrim [Sulfamethoxazole-Trimethoprim] Rash       Family History   Problem Relation Age of Onset    Diabetes Mother     Hypertension Mother     Muscular Dystrophy Mother        Social History     Socioeconomic History    Marital status:      Spouse name: Not on file    Number of children: Not on file    Years of education: Not on file    Highest education level: Not on file   Occupational History    Not on file   Tobacco Use    Smoking status: Never    Smokeless tobacco: Never   Substance and Sexual Activity    Alcohol use: Yes     Comment: 1 every two months    Drug use: No    Sexual activity: Yes     Partners: Male   Other Topics Concern    Not on file   Social History Narrative    How much exercise per week? Walking daily    How much calcium per day? In dairy       How much caffeine per day? 2 cups  tea    How much vitamin D per day? Sun light and foods    Do you/your family wear seatbelts?  Yes    Do you/your family use safety helmets? Yes    Do you/your family use sunscreen? Yes    Do you/your family keep firearms in the home? No    Do you/your family have a smoke detector(s)? Yes        [facility]                  Social Determinants of Health     Financial Resource Strain: Not on file   Food Insecurity: Not on file   Transportation Needs: Not on file   Physical  Activity: Not on file   Stress: Not on file   Social Connections: Not on file   Intimate Partner Violence: Not on file   Housing Stability: Not on file       ROS: 10-Point ROS negative except as noted in HPI    Physical Exam  /69   Pulse 85   Wt 58.5 kg (129 lb)   LMP 2023   BMI 20.20 kg/m    Gen: Well-appearing, NAD  HEENT: Normocephalic, atraumatic  Abd: Soft, non-tender  Ext: No LE edema, extremities warm and well perfused    Pelvic:  Normal appearing external female genitalia. Normal hair distribution. Vagina is without lesions. normal discharge. Difficult to visualize external os of cervix, could only visualize anterior lip, patient unable to tolerate exam. Blind pap smear obtained.  Endometrial biopsy was not completed. Bi manual showed bulky uterus, measuring up to ~ 12 cm.     Assessment/Plan:  Sakina Bowling is a 43 year old  female here for surgical consultation for definitive management of fibroids.     Fibroids  Lengthy discussion had with patient about surgical management, risks and benefits of hysterectomy versus myomectomy. Given patient does not desire future fertility, recommend robot assisted total hysterectomy with salpingectomy as safest option. Given robot availability and patient's schedule, most likely will be in 2024. Case request sent today.   - MRI ordered to better evaluate fibroids as patient concerned about possible malignant transformation. Offered reassurance that it is unlikely based on long standing history of fibroids and imaging findings.   - Given interval of time until surgery, recommend medical management to bridge with OCP's until time of surgery. Also recommend TXA to take on heaviest days of bleeding, up to five days. Patient has no contraindications to estrogen. Did discuss that during her international flights, recommend getting up to move around.   - Symptoms concerning for possible chronic blood loss anemia or hypothyroidism,  CBC/TSH/Ferritin ordered today  - Pap collected today, no history of abnormal paps   - Attempted EMBx today to evaluate lining prior to hysterectomy, informed consent obtained, however patient unable to tolerate pelvic exam and unable to visualize external os so it was not collected today. Patient has no risk factors for hyperplasia, so ok to proceed with surgery without EMBx.         Staffed and Seen with Dr. Miranda.     Trixie Palm MD  Obstetrics and Gynecology, PGY-1  09/14/23 4:24 PM    Appreciate Dr. Palm's note above, patient also seen and examined by me. I agree with the note above.   Meena Miranda MD

## 2023-09-18 LAB
BKR LAB AP GYN ADEQUACY: NORMAL
BKR LAB AP GYN INTERPRETATION: NORMAL
BKR LAB AP HPV REFLEX: NORMAL
BKR LAB AP LMP: NORMAL
BKR LAB AP PREVIOUS ABNORMAL: NORMAL
PATH REPORT.COMMENTS IMP SPEC: NORMAL
PATH REPORT.COMMENTS IMP SPEC: NORMAL
PATH REPORT.RELEVANT HX SPEC: NORMAL

## 2023-09-20 LAB
HUMAN PAPILLOMA VIRUS 16 DNA: NEGATIVE
HUMAN PAPILLOMA VIRUS 18 DNA: NEGATIVE
HUMAN PAPILLOMA VIRUS FINAL DIAGNOSIS: NORMAL
HUMAN PAPILLOMA VIRUS OTHER HR: NEGATIVE

## 2023-09-22 ENCOUNTER — PATIENT OUTREACH (OUTPATIENT)
Dept: GASTROENTEROLOGY | Facility: CLINIC | Age: 43
End: 2023-09-22
Payer: COMMERCIAL

## 2023-09-22 NOTE — TELEPHONE ENCOUNTER
Pt called with request to schedule procedure, left VM.    Returned pt call and got VM, left message with specific dates at [facility] to consider, 10/12, 10/27 or 11/1    Procedure/Imaging/Clinic: EGD  Physician: Dr Branham  Timing: next available  Scope time needed: per MD average  Anesthesia: MAC  Dx: Gastroesophageal reflux disease without esophagitis  Tier: 3  Location: [facility]  Header of letter for pt communication:   Upper endoscopy    Aleisha Dorado, RN, BSN,   [facility]  Care coordinator

## 2023-10-05 ENCOUNTER — HOSPITAL ENCOUNTER (OUTPATIENT)
Dept: MRI IMAGING | Facility: CLINIC | Age: 43
Discharge: HOME | End: 2023-10-05
Attending: OBSTETRICS & GYNECOLOGY | Admitting: OBSTETRICS & GYNECOLOGY
Payer: COMMERCIAL

## 2023-10-05 DIAGNOSIS — D25.9 UTERINE LEIOMYOMA, UNSPECIFIED LOCATION: ICD-10-CM

## 2023-10-05 PROCEDURE — A9585 GADOBUTROL INJECTION: HCPCS | Mod: JZ | Performed by: OBSTETRICS & GYNECOLOGY

## 2023-10-05 PROCEDURE — 72197 MRI PELVIS W/O & W/DYE: CPT

## 2023-10-05 PROCEDURE — 255N000002 HC RX 255 OP 636: Mod: JZ | Performed by: OBSTETRICS & GYNECOLOGY

## 2023-10-05 PROCEDURE — 72197 MRI PELVIS W/O & W/DYE: CPT | Mod: 26 | Performed by: RADIOLOGY

## 2023-10-05 RX ORDER — GADOBUTROL 604.72 MG/ML
7.5 INJECTION INTRAVENOUS ONCE
Status: COMPLETED | OUTPATIENT
Start: 2023-10-05 | End: 2023-10-05

## 2023-10-05 RX ADMIN — GADOBUTROL 6 ML: 604.72 INJECTION INTRAVENOUS at 17:37

## 2023-10-10 ENCOUNTER — PREP FOR PROCEDURE (OUTPATIENT)
Dept: OBGYN | Facility: CLINIC | Age: 43
End: 2023-10-10
Payer: COMMERCIAL

## 2023-10-10 ENCOUNTER — LAB (OUTPATIENT)
Dept: LAB | Facility: CLINIC | Age: 43
End: 2023-10-10
Payer: COMMERCIAL

## 2023-10-10 DIAGNOSIS — E61.1 LOW IRON: ICD-10-CM

## 2023-10-10 DIAGNOSIS — D25.9 UTERINE LEIOMYOMA, UNSPECIFIED LOCATION: ICD-10-CM

## 2023-10-10 LAB
BASO+EOS+MONOS # BLD AUTO: ABNORMAL 10*3/UL
BASO+EOS+MONOS NFR BLD AUTO: ABNORMAL %
BASOPHILS # BLD AUTO: 0.1 10E3/UL (ref 0–0.2)
BASOPHILS NFR BLD AUTO: 1 %
CHOLEST SERPL-MCNC: 182 MG/DL
EOSINOPHIL # BLD AUTO: 0.4 10E3/UL (ref 0–0.7)
EOSINOPHIL NFR BLD AUTO: 5 %
ERYTHROCYTE [DISTWIDTH] IN BLOOD BY AUTOMATED COUNT: 18.4 % (ref 10–15)
FERRITIN SERPL-MCNC: 5 NG/ML (ref 6–175)
FOLATE SERPL-MCNC: 16.3 NG/ML (ref 4.6–34.8)
HCT VFR BLD AUTO: 28 % (ref 35–47)
HDLC SERPL-MCNC: 60 MG/DL
HGB BLD-MCNC: 7.8 G/DL (ref 11.7–15.7)
IMM GRANULOCYTES # BLD: 0 10E3/UL
IMM GRANULOCYTES NFR BLD: 0 %
IRON BINDING CAPACITY (ROCHE): 580 UG/DL (ref 240–430)
IRON SATN MFR SERPL: 2 % (ref 15–46)
IRON SERPL-MCNC: 12 UG/DL (ref 37–145)
LDLC SERPL CALC-MCNC: 83 MG/DL
LYMPHOCYTES # BLD AUTO: 2.4 10E3/UL (ref 0.8–5.3)
LYMPHOCYTES NFR BLD AUTO: 31 %
MCH RBC QN AUTO: 18.3 PG (ref 26.5–33)
MCHC RBC AUTO-ENTMCNC: 27.9 G/DL (ref 31.5–36.5)
MCV RBC AUTO: 66 FL (ref 78–100)
MONOCYTES # BLD AUTO: 0.6 10E3/UL (ref 0–1.3)
MONOCYTES NFR BLD AUTO: 8 %
NEUTROPHILS # BLD AUTO: 4.3 10E3/UL (ref 1.6–8.3)
NEUTROPHILS NFR BLD AUTO: 55 %
NONHDLC SERPL-MCNC: 122 MG/DL
NRBC # BLD AUTO: 0 10E3/UL
NRBC BLD AUTO-RTO: 0 /100
PLATELET # BLD AUTO: 356 10E3/UL (ref 150–450)
RBC # BLD AUTO: 4.27 10E6/UL (ref 3.8–5.2)
TRIGL SERPL-MCNC: 196 MG/DL
TSH SERPL DL<=0.005 MIU/L-ACNC: 1.99 UIU/ML (ref 0.3–4.2)
VIT B12 SERPL-MCNC: 347 PG/ML (ref 232–1245)
WBC # BLD AUTO: 7.7 10E3/UL (ref 4–11)

## 2023-10-10 PROCEDURE — 99000 SPECIMEN HANDLING OFFICE-LAB: CPT | Performed by: PATHOLOGY

## 2023-10-10 PROCEDURE — 85025 COMPLETE CBC W/AUTO DIFF WBC: CPT | Performed by: PATHOLOGY

## 2023-10-10 PROCEDURE — 82607 VITAMIN B-12: CPT | Performed by: INTERNAL MEDICINE

## 2023-10-10 PROCEDURE — 80061 LIPID PANEL: CPT | Performed by: PATHOLOGY

## 2023-10-10 PROCEDURE — 83540 ASSAY OF IRON: CPT | Performed by: PATHOLOGY

## 2023-10-10 PROCEDURE — 83550 IRON BINDING TEST: CPT | Performed by: PATHOLOGY

## 2023-10-10 PROCEDURE — 82728 ASSAY OF FERRITIN: CPT | Performed by: PATHOLOGY

## 2023-10-10 PROCEDURE — 84443 ASSAY THYROID STIM HORMONE: CPT | Performed by: PATHOLOGY

## 2023-10-10 PROCEDURE — 36415 COLL VENOUS BLD VENIPUNCTURE: CPT | Performed by: PATHOLOGY

## 2023-10-10 PROCEDURE — 82746 ASSAY OF FOLIC ACID SERUM: CPT | Performed by: INTERNAL MEDICINE

## 2023-10-11 ENCOUNTER — PREP FOR PROCEDURE (OUTPATIENT)
Dept: GASTROENTEROLOGY | Facility: CLINIC | Age: 43
End: 2023-10-11
Payer: COMMERCIAL

## 2023-10-11 ENCOUNTER — PATIENT OUTREACH (OUTPATIENT)
Dept: GASTROENTEROLOGY | Facility: CLINIC | Age: 43
End: 2023-10-11
Payer: COMMERCIAL

## 2023-10-11 DIAGNOSIS — D64.9 ANEMIA: ICD-10-CM

## 2023-10-11 DIAGNOSIS — R10.13 EPIGASTRIC PAIN: Primary | ICD-10-CM

## 2023-10-11 NOTE — TELEPHONE ENCOUNTER
Called patient to discuss scheduling procedure with Dr Branham for tomorrow 10/12 at [facility].    Procedure/Imaging/Clinic: EGD   Physician: Dr Branham   Timing: next available   Scope time needed: 15 mins   Anesthesia: Moderate sedation   Dx: epigastric pain, anemia   Tier: 2   Location: [facility]   Header of letter for pt communication:    Upper endoscopy     Pt in agreement with timing. Provided arrival time of 2pm (3:20pm procedure start time). Discussed NPO timing around that arrival time.     Explained they will need a , someone to stay with them for 24 hours and should stay in town for 24 hours (within 45 min of Hospital) post procedure    Preop Plan: not needed, moderate sedation to be used    Does patient have [employer] insurance?:[employer]    Med Review    Blood thinner -  none  ASA - none  Diabetic - none  Any meds by injection or mouth for weight loss or diabetes-none    Patient Education r/t procedure: mychart    NPO/Prep:   Adults and Children of all ages may consume solids up to 8 hours prior to arrival time - may consume clear liquids up to 1 hour prior to arrival time.    Verbalized understanding of all instructions. All questions answered.     Procedure order placed, message routed to OR     Aleisah Dorado, RN, BSN,   [facility]  Care coordinator

## 2023-10-12 ENCOUNTER — HOSPITAL ENCOUNTER (OUTPATIENT)
Facility: CLINIC | Age: 43
Discharge: HOME | End: 2023-10-12
Attending: INTERNAL MEDICINE | Admitting: INTERNAL MEDICINE
Payer: COMMERCIAL

## 2023-10-12 ENCOUNTER — PATIENT OUTREACH (OUTPATIENT)
Dept: ONCOLOGY | Facility: CLINIC | Age: 43
End: 2023-10-12
Payer: COMMERCIAL

## 2023-10-12 VITALS
SYSTOLIC BLOOD PRESSURE: 92 MMHG | RESPIRATION RATE: 12 BRPM | OXYGEN SATURATION: 100 % | DIASTOLIC BLOOD PRESSURE: 65 MMHG | HEART RATE: 81 BPM

## 2023-10-12 DIAGNOSIS — K29.00 ACUTE SUPERFICIAL GASTRITIS WITHOUT HEMORRHAGE: Primary | ICD-10-CM

## 2023-10-12 DIAGNOSIS — R10.13 DYSPEPSIA: ICD-10-CM

## 2023-10-12 DIAGNOSIS — D50.0 IRON DEFICIENCY ANEMIA DUE TO CHRONIC BLOOD LOSS: Primary | ICD-10-CM

## 2023-10-12 DIAGNOSIS — D50.0 IRON DEFICIENCY ANEMIA DUE TO CHRONIC BLOOD LOSS: ICD-10-CM

## 2023-10-12 LAB — UPPER GI ENDOSCOPY: NORMAL

## 2023-10-12 PROCEDURE — 88305 TISSUE EXAM BY PATHOLOGIST: CPT | Mod: TC | Performed by: INTERNAL MEDICINE

## 2023-10-12 PROCEDURE — 250N000011 HC RX IP 250 OP 636: Performed by: INTERNAL MEDICINE

## 2023-10-12 PROCEDURE — 250N000009 HC RX 250: Performed by: INTERNAL MEDICINE

## 2023-10-12 PROCEDURE — G0500 MOD SEDAT ENDO SERVICE >5YRS: HCPCS | Performed by: INTERNAL MEDICINE

## 2023-10-12 PROCEDURE — 43239 EGD BIOPSY SINGLE/MULTIPLE: CPT | Performed by: INTERNAL MEDICINE

## 2023-10-12 PROCEDURE — 88342 IMHCHEM/IMCYTCHM 1ST ANTB: CPT | Mod: 26 | Performed by: PATHOLOGY

## 2023-10-12 PROCEDURE — 88305 TISSUE EXAM BY PATHOLOGIST: CPT | Mod: 26 | Performed by: PATHOLOGY

## 2023-10-12 RX ORDER — FENTANYL CITRATE 50 UG/ML
INJECTION, SOLUTION INTRAMUSCULAR; INTRAVENOUS PRN
Status: DISCONTINUED | OUTPATIENT
Start: 2023-10-12 | End: 2023-10-12 | Stop reason: HOSPADM

## 2023-10-12 RX ORDER — HEPARIN SODIUM,PORCINE 10 UNIT/ML
5-20 VIAL (ML) INTRAVENOUS DAILY PRN
Status: CANCELLED | OUTPATIENT
Start: 2023-10-12

## 2023-10-12 RX ORDER — ALBUTEROL SULFATE 90 UG/1
1-2 AEROSOL, METERED RESPIRATORY (INHALATION)
Status: CANCELLED
Start: 2023-10-12

## 2023-10-12 RX ORDER — LIDOCAINE 40 MG/G
CREAM TOPICAL
Status: DISCONTINUED | OUTPATIENT
Start: 2023-10-12 | End: 2023-10-12 | Stop reason: HOSPADM

## 2023-10-12 RX ORDER — ALBUTEROL SULFATE 0.83 MG/ML
2.5 SOLUTION RESPIRATORY (INHALATION)
Status: CANCELLED | OUTPATIENT
Start: 2023-10-12

## 2023-10-12 RX ORDER — DIPHENHYDRAMINE HYDROCHLORIDE 50 MG/ML
50 INJECTION INTRAMUSCULAR; INTRAVENOUS
Status: CANCELLED
Start: 2023-10-12

## 2023-10-12 RX ORDER — ONDANSETRON 2 MG/ML
4 INJECTION INTRAMUSCULAR; INTRAVENOUS
Status: DISCONTINUED | OUTPATIENT
Start: 2023-10-12 | End: 2023-10-12 | Stop reason: HOSPADM

## 2023-10-12 RX ORDER — OMEPRAZOLE 40 MG/1
40 CAPSULE, DELAYED RELEASE ORAL DAILY
Qty: 90 CAPSULE | Refills: 0 | Status: SHIPPED | OUTPATIENT
Start: 2023-10-12 | End: 2024-01-10

## 2023-10-12 RX ORDER — EPINEPHRINE 1 MG/ML
0.3 INJECTION, SOLUTION INTRAMUSCULAR; SUBCUTANEOUS EVERY 5 MIN PRN
Status: CANCELLED | OUTPATIENT
Start: 2023-10-12

## 2023-10-12 RX ORDER — ESOMEPRAZOLE MAGNESIUM 40 MG/1
40 CAPSULE, DELAYED RELEASE ORAL
Status: ON HOLD | COMMUNITY
End: 2024-01-12

## 2023-10-12 RX ORDER — EPINEPHRINE 1 MG/ML
0.3 INJECTION, SOLUTION, CONCENTRATE INTRAVENOUS EVERY 5 MIN PRN
Status: CANCELLED | OUTPATIENT
Start: 2023-10-12

## 2023-10-12 RX ORDER — HEPARIN SODIUM (PORCINE) LOCK FLUSH IV SOLN 100 UNIT/ML 100 UNIT/ML
5 SOLUTION INTRAVENOUS
Status: CANCELLED | OUTPATIENT
Start: 2023-10-12

## 2023-10-12 RX ORDER — METHYLPREDNISOLONE SODIUM SUCCINATE 125 MG/2ML
125 INJECTION, POWDER, LYOPHILIZED, FOR SOLUTION INTRAMUSCULAR; INTRAVENOUS
Status: CANCELLED
Start: 2023-10-12

## 2023-10-12 RX ORDER — MEPERIDINE HYDROCHLORIDE 25 MG/ML
25 INJECTION INTRAMUSCULAR; INTRAVENOUS; SUBCUTANEOUS EVERY 30 MIN PRN
Status: CANCELLED | OUTPATIENT
Start: 2023-10-12

## 2023-10-12 ASSESSMENT — ACTIVITIES OF DAILY LIVING (ADL): ADLS_ACUITY_SCORE: 35

## 2023-10-12 NOTE — TELEPHONE ENCOUNTER
[facility]: Hematology                                                                                           has placed orders for IV iron.  InFed preferred due to time commitment for one infusion vs 2 shorter ones.     Infusion is needed ASAP    Messages sent to infusions areas with request    Appointment and financial clearance obtained     Ashlee Jackman LPN  Hematology Care Coordination  [phone]

## 2023-10-13 ENCOUNTER — INFUSION THERAPY VISIT (OUTPATIENT)
Dept: INFUSION THERAPY | Facility: CLINIC | Age: 43
End: 2023-10-13
Attending: INTERNAL MEDICINE
Payer: COMMERCIAL

## 2023-10-13 VITALS
OXYGEN SATURATION: 99 % | BODY MASS INDEX: 20.48 KG/M2 | SYSTOLIC BLOOD PRESSURE: 105 MMHG | WEIGHT: 130.8 LBS | RESPIRATION RATE: 16 BRPM | HEART RATE: 70 BPM | DIASTOLIC BLOOD PRESSURE: 66 MMHG | TEMPERATURE: 98.2 F

## 2023-10-13 DIAGNOSIS — D50.0 IRON DEFICIENCY ANEMIA DUE TO CHRONIC BLOOD LOSS: Primary | ICD-10-CM

## 2023-10-13 PROCEDURE — 96366 THER/PROPH/DIAG IV INF ADDON: CPT

## 2023-10-13 PROCEDURE — 96367 TX/PROPH/DG ADDL SEQ IV INF: CPT

## 2023-10-13 PROCEDURE — 258N000003 HC RX IP 258 OP 636: Performed by: INTERNAL MEDICINE

## 2023-10-13 PROCEDURE — 250N000011 HC RX IP 250 OP 636: Performed by: INTERNAL MEDICINE

## 2023-10-13 PROCEDURE — 96365 THER/PROPH/DIAG IV INF INIT: CPT

## 2023-10-13 RX ORDER — METHYLPREDNISOLONE SODIUM SUCCINATE 125 MG/2ML
125 INJECTION, POWDER, LYOPHILIZED, FOR SOLUTION INTRAMUSCULAR; INTRAVENOUS
Status: CANCELLED
Start: 2023-10-13

## 2023-10-13 RX ORDER — MEPERIDINE HYDROCHLORIDE 25 MG/ML
25 INJECTION INTRAMUSCULAR; INTRAVENOUS; SUBCUTANEOUS EVERY 30 MIN PRN
Status: CANCELLED | OUTPATIENT
Start: 2023-10-13

## 2023-10-13 RX ORDER — HEPARIN SODIUM (PORCINE) LOCK FLUSH IV SOLN 100 UNIT/ML 100 UNIT/ML
5 SOLUTION INTRAVENOUS
Status: CANCELLED | OUTPATIENT
Start: 2023-10-13

## 2023-10-13 RX ORDER — ALBUTEROL SULFATE 0.83 MG/ML
2.5 SOLUTION RESPIRATORY (INHALATION)
Status: CANCELLED | OUTPATIENT
Start: 2023-10-13

## 2023-10-13 RX ORDER — EPINEPHRINE 1 MG/ML
0.3 INJECTION, SOLUTION INTRAMUSCULAR; SUBCUTANEOUS EVERY 5 MIN PRN
Status: CANCELLED | OUTPATIENT
Start: 2023-10-13

## 2023-10-13 RX ORDER — ALBUTEROL SULFATE 90 UG/1
1-2 AEROSOL, METERED RESPIRATORY (INHALATION)
Status: CANCELLED
Start: 2023-10-13

## 2023-10-13 RX ORDER — HEPARIN SODIUM,PORCINE 10 UNIT/ML
5-20 VIAL (ML) INTRAVENOUS DAILY PRN
Status: CANCELLED | OUTPATIENT
Start: 2023-10-13

## 2023-10-13 RX ORDER — DIPHENHYDRAMINE HYDROCHLORIDE 50 MG/ML
50 INJECTION INTRAMUSCULAR; INTRAVENOUS
Status: CANCELLED
Start: 2023-10-13

## 2023-10-13 RX ADMIN — SODIUM CHLORIDE 975 MG: 9 INJECTION, SOLUTION INTRAVENOUS at 10:11

## 2023-10-13 RX ADMIN — SODIUM CHLORIDE 25 MG: 9 INJECTION, SOLUTION INTRAVENOUS at 08:39

## 2023-10-13 NOTE — PROGRESS NOTES
Infusion Nursing Note:  Sakina Bowling presents today for Infed.    Patient seen by provider today: No   present during visit today: Not Applicable.    Note: Pt presenting for first dose infed. Pt having significant fatigue.      Intravenous Access:  Peripheral IV placed.    Treatment Conditions:  Not Applicable.      Post Infusion Assessment:  Patient tolerated infusion without incident.  Patient observed for 1 hour post infed test dose per protocol. Full dose infed started after this was completed.   Site patent and intact, free from redness, edema or discomfort.  Access discontinued per protocol.       Discharge Plan:   Patient and/or family verbalized understanding of discharge instructions and all questions answered.  AVS to patient via [employer].  Patient will return as needed for next appointment.   Patient discharged in stable condition accompanied by: self.    Administrations This Visit       iron dextran complex (INFED) 25 mg in sodium chloride 0.9 % 55.5 mL intermittent infusion (test dose)       Admin Date  10/13/2023 Action  $New Bag Dose  25 mg Rate  666 mL/hr Route  Intravenous Documented By  Gabriela Antonio RN              iron dextran complex (INFED) 975 mg in sodium chloride 0.9 % 569.5 mL intermittent infusion       Admin Date  10/13/2023 Action  $New Bag Dose  975 mg Rate  207.1 mL/hr Route  Intravenous Documented By  Gabriela Antonio RN                  /72   Pulse 77   Temp 98.2  F (36.8  C) (Oral)   Resp 16   Wt 59.3 kg (130 lb 12.8 oz)   LMP 08/28/2023   SpO2 100%   BMI 20.48 kg/m        Gabriela Antonio RN

## 2023-10-13 NOTE — PATIENT INSTRUCTIONS
Dear Sakina Bowling    Thank you for choosing [facility] ([facility]) for your infusion.  The following information is a summary of our appointment as well as important reminders.      We look forward in seeing you on your next appointment here at [facility] ([facility]).  Please don t hesitate to call us at [phone] to reschedule any of your appointments or to speak with one of the [facility] registered nurses.  It was a pleasure taking care of you today.    Sincerely,    [facility]  [facility]  [address] [place] [address]  [address]  Phone:  [phone]

## 2023-10-18 ENCOUNTER — TELEPHONE (OUTPATIENT)
Dept: GASTROENTEROLOGY | Facility: CLINIC | Age: 43
End: 2023-10-18
Payer: COMMERCIAL

## 2023-10-18 NOTE — TELEPHONE ENCOUNTER
Pathology ordered by John Branham on 10/17/2023 faxed to [phone].        Dania Valentin LPN

## 2023-10-18 NOTE — TELEPHONE ENCOUNTER
"----- Message from Aleisha Dorado RN sent at 10/18/2023  3:14 PM CDT -----  Regarding: Lab order to fax  Hi team    Please fax the \"pathology additional testing, H pylori\" order from yesterday to fax [phone] today if possible.    Thank you  Silvia    ----- Message -----  From: John Branham MD  Sent: 10/18/2023   2:45 PM CDT  To: Aleisha Dorado RN; MD Silvia Mcguire,  For some reason, they didn't do the H. Pylori staining on the gastric biopsies even though we asked. I was in a phone loop to try to figure this out. Apparently, the gastric biopsies were read out by a [facility] pathologist who needs to be the one to add that on even though I guess the actual physical slides are at [facility] (not [facility]). I talked to someone over at [facility] (this pathologist isn't in) and they need an order faxed to them as an Epic order isn't enough.    Could you do me a favor Silvia? If you go under the labs tab in epic, you'll see my add on order from yesterday for H. Pylori. Can you fax that to this number: [phone]  Thanks      GELY Cannon. In case you're wondering why this is taking so long.    John        "

## 2023-10-27 LAB
PATH REPORT.ADDENDUM SPEC: NORMAL
PATH REPORT.COMMENTS IMP SPEC: NORMAL
PATH REPORT.COMMENTS IMP SPEC: NORMAL
PATH REPORT.FINAL DX SPEC: NORMAL
PATH REPORT.GROSS SPEC: NORMAL
PATH REPORT.MICROSCOPIC SPEC OTHER STN: NORMAL
PATH REPORT.RELEVANT HX SPEC: NORMAL
PHOTO IMAGE: NORMAL

## 2023-10-30 LAB
BKR LAB AP ADD'L TEST STATUS: NORMAL
BKR PATH ADDL TEST FINAL COMMENTS: NORMAL

## 2023-11-06 ENCOUNTER — LAB (OUTPATIENT)
Dept: LAB | Facility: CLINIC | Age: 43
End: 2023-11-06
Payer: COMMERCIAL

## 2023-11-06 DIAGNOSIS — D64.9 ANEMIA, UNSPECIFIED TYPE: ICD-10-CM

## 2023-11-06 LAB
BASOPHILS # BLD AUTO: 0.1 10E3/UL (ref 0–0.2)
BASOPHILS NFR BLD AUTO: 1 %
EOSINOPHIL # BLD AUTO: 0.3 10E3/UL (ref 0–0.7)
EOSINOPHIL NFR BLD AUTO: 3 %
ERYTHROCYTE [DISTWIDTH] IN BLOOD BY AUTOMATED COUNT: ABNORMAL %
FERRITIN SERPL-MCNC: 260 NG/ML (ref 6–175)
HCT VFR BLD AUTO: 38.8 % (ref 35–47)
HGB BLD-MCNC: 12 G/DL (ref 11.7–15.7)
IMM GRANULOCYTES # BLD: 0 10E3/UL
IMM GRANULOCYTES NFR BLD: 0 %
IRON BINDING CAPACITY (ROCHE): 410 UG/DL (ref 240–430)
IRON SATN MFR SERPL: 17 % (ref 15–46)
IRON SERPL-MCNC: 71 UG/DL (ref 37–145)
LYMPHOCYTES # BLD AUTO: 2.5 10E3/UL (ref 0.8–5.3)
LYMPHOCYTES NFR BLD AUTO: 30 %
MCH RBC QN AUTO: 23.8 PG (ref 26.5–33)
MCHC RBC AUTO-ENTMCNC: 30.9 G/DL (ref 31.5–36.5)
MCV RBC AUTO: 77 FL (ref 78–100)
MONOCYTES # BLD AUTO: 0.5 10E3/UL (ref 0–1.3)
MONOCYTES NFR BLD AUTO: 7 %
NEUTROPHILS # BLD AUTO: 4.8 10E3/UL (ref 1.6–8.3)
NEUTROPHILS NFR BLD AUTO: 59 %
NRBC # BLD AUTO: 0 10E3/UL
NRBC BLD AUTO-RTO: 0 /100
PLATELET # BLD AUTO: 353 10E3/UL (ref 150–450)
RBC # BLD AUTO: 5.04 10E6/UL (ref 3.8–5.2)
WBC # BLD AUTO: 8.1 10E3/UL (ref 4–11)

## 2023-11-06 PROCEDURE — 36415 COLL VENOUS BLD VENIPUNCTURE: CPT | Performed by: PATHOLOGY

## 2023-11-06 PROCEDURE — 83540 ASSAY OF IRON: CPT | Performed by: PATHOLOGY

## 2023-11-06 PROCEDURE — 85025 COMPLETE CBC W/AUTO DIFF WBC: CPT | Performed by: PATHOLOGY

## 2023-11-06 PROCEDURE — 82728 ASSAY OF FERRITIN: CPT | Performed by: PATHOLOGY

## 2023-11-06 PROCEDURE — 83550 IRON BINDING TEST: CPT | Performed by: PATHOLOGY

## 2023-12-04 ENCOUNTER — ALLIED HEALTH/NURSE VISIT (OUTPATIENT)
Dept: INTERNAL MEDICINE | Facility: CLINIC | Age: 43
End: 2023-12-04
Payer: COMMERCIAL

## 2023-12-04 DIAGNOSIS — Z23 NEED FOR VACCINATION: Primary | ICD-10-CM

## 2023-12-04 PROCEDURE — 90472 IMMUNIZATION ADMIN EACH ADD: CPT

## 2023-12-04 PROCEDURE — 90632 HEPA VACCINE ADULT IM: CPT

## 2023-12-04 PROCEDURE — 90480 ADMN SARSCOV2 VAC 1/ONLY CMP: CPT

## 2023-12-04 PROCEDURE — 99207 PR NO CHARGE NURSE ONLY: CPT

## 2023-12-04 PROCEDURE — 91320 SARSCV2 VAC 30MCG TRS-SUC IM: CPT

## 2023-12-04 PROCEDURE — 90715 TDAP VACCINE 7 YRS/> IM: CPT

## 2023-12-04 PROCEDURE — 90471 IMMUNIZATION ADMIN: CPT

## 2023-12-04 NOTE — PROGRESS NOTES
Sakina Bowling received the Covid-19, Tdap and Hepatitis A vaccines today in clinic at the request of Dr. Crawford. The immunization was given under the supervision of Dr. Crawford if assistance was needed. The patient does not report a history of adverse reactions associated with vaccine administration. The immunization site was cleaned with an alcohol prep wipe. The immunization was given without incident--see immunization list for administration details. No swelling or redness was observed at the site of injection after the immunization was given. The patient was advised to remain in fourth floor lobby of the [facility] for fifteen minutes after the injection in case of an adverse reaction.     Melissa Argueta, EMT at 1:26 PM on 12/4/2023

## 2024-01-04 ENCOUNTER — LAB (OUTPATIENT)
Dept: LAB | Facility: CLINIC | Age: 44
End: 2024-01-04
Payer: COMMERCIAL

## 2024-01-04 ENCOUNTER — OFFICE VISIT (OUTPATIENT)
Dept: INTERNAL MEDICINE | Facility: CLINIC | Age: 44
End: 2024-01-04
Payer: COMMERCIAL

## 2024-01-04 VITALS
HEART RATE: 73 BPM | SYSTOLIC BLOOD PRESSURE: 104 MMHG | HEIGHT: 67 IN | OXYGEN SATURATION: 100 % | WEIGHT: 130.4 LBS | DIASTOLIC BLOOD PRESSURE: 67 MMHG | BODY MASS INDEX: 20.47 KG/M2

## 2024-01-04 DIAGNOSIS — D62 ANEMIA DUE TO BLOOD LOSS, ACUTE: Primary | ICD-10-CM

## 2024-01-04 DIAGNOSIS — D62 ANEMIA DUE TO BLOOD LOSS, ACUTE: ICD-10-CM

## 2024-01-04 DIAGNOSIS — D64.9 ANEMIA, UNSPECIFIED TYPE: ICD-10-CM

## 2024-01-04 LAB
ALBUMIN SERPL BCG-MCNC: 4.2 G/DL (ref 3.5–5.2)
ALP SERPL-CCNC: 53 U/L (ref 40–150)
ALT SERPL W P-5'-P-CCNC: 11 U/L (ref 0–50)
ANION GAP SERPL CALCULATED.3IONS-SCNC: 9 MMOL/L (ref 7–15)
AST SERPL W P-5'-P-CCNC: 19 U/L (ref 0–45)
BASOPHILS # BLD AUTO: 0 10E3/UL (ref 0–0.2)
BASOPHILS NFR BLD AUTO: 1 %
BILIRUB SERPL-MCNC: 0.2 MG/DL
BUN SERPL-MCNC: 7.3 MG/DL (ref 6–20)
CALCIUM SERPL-MCNC: 10.6 MG/DL (ref 8.6–10)
CHLORIDE SERPL-SCNC: 105 MMOL/L (ref 98–107)
CREAT SERPL-MCNC: 0.66 MG/DL (ref 0.51–0.95)
DEPRECATED HCO3 PLAS-SCNC: 25 MMOL/L (ref 22–29)
EGFRCR SERPLBLD CKD-EPI 2021: >90 ML/MIN/1.73M2
EOSINOPHIL # BLD AUTO: 0.2 10E3/UL (ref 0–0.7)
EOSINOPHIL NFR BLD AUTO: 5 %
ERYTHROCYTE [DISTWIDTH] IN BLOOD BY AUTOMATED COUNT: 17.3 % (ref 10–15)
FERRITIN SERPL-MCNC: 16 NG/ML (ref 6–175)
GLUCOSE SERPL-MCNC: 89 MG/DL (ref 70–99)
HCT VFR BLD AUTO: 44.9 % (ref 35–47)
HGB BLD-MCNC: 14.3 G/DL (ref 11.7–15.7)
IMM GRANULOCYTES # BLD: 0 10E3/UL
IMM GRANULOCYTES NFR BLD: 0 %
IRON BINDING CAPACITY (ROCHE): 501 UG/DL (ref 240–430)
IRON SATN MFR SERPL: 13 % (ref 15–46)
IRON SERPL-MCNC: 63 UG/DL (ref 37–145)
LYMPHOCYTES # BLD AUTO: 1.6 10E3/UL (ref 0.8–5.3)
LYMPHOCYTES NFR BLD AUTO: 36 %
MCH RBC QN AUTO: 26.9 PG (ref 26.5–33)
MCHC RBC AUTO-ENTMCNC: 31.8 G/DL (ref 31.5–36.5)
MCV RBC AUTO: 85 FL (ref 78–100)
MONOCYTES # BLD AUTO: 0.4 10E3/UL (ref 0–1.3)
MONOCYTES NFR BLD AUTO: 9 %
NEUTROPHILS # BLD AUTO: 2.2 10E3/UL (ref 1.6–8.3)
NEUTROPHILS NFR BLD AUTO: 49 %
NRBC # BLD AUTO: 0 10E3/UL
NRBC BLD AUTO-RTO: 0 /100
PLATELET # BLD AUTO: 308 10E3/UL (ref 150–450)
POTASSIUM SERPL-SCNC: 3.9 MMOL/L (ref 3.4–5.3)
PROT SERPL-MCNC: 7.7 G/DL (ref 6.4–8.3)
RBC # BLD AUTO: 5.31 10E6/UL (ref 3.8–5.2)
SODIUM SERPL-SCNC: 139 MMOL/L (ref 135–145)
WBC # BLD AUTO: 4.4 10E3/UL (ref 4–11)

## 2024-01-04 PROCEDURE — 85025 COMPLETE CBC W/AUTO DIFF WBC: CPT | Performed by: PATHOLOGY

## 2024-01-04 PROCEDURE — 83540 ASSAY OF IRON: CPT | Performed by: PATHOLOGY

## 2024-01-04 PROCEDURE — 82728 ASSAY OF FERRITIN: CPT | Performed by: PATHOLOGY

## 2024-01-04 PROCEDURE — 83550 IRON BINDING TEST: CPT | Performed by: PATHOLOGY

## 2024-01-04 PROCEDURE — 80053 COMPREHEN METABOLIC PANEL: CPT | Performed by: PATHOLOGY

## 2024-01-04 PROCEDURE — 99214 OFFICE O/P EST MOD 30 MIN: CPT | Performed by: INTERNAL MEDICINE

## 2024-01-04 PROCEDURE — 36415 COLL VENOUS BLD VENIPUNCTURE: CPT | Performed by: PATHOLOGY

## 2024-01-04 ASSESSMENT — PAIN SCALES - GENERAL: PAINLEVEL: NO PAIN (0)

## 2024-01-04 NOTE — PROGRESS NOTES
Sakina is a 43 year old that presents in clinic today for the following:     Chief Complaint   Patient presents with    Pre-Op Exam           1/4/2024     7:57 AM   Additional Questions   Roomed by MVO       Screenings as of today     PHQ-2 Total Score (Adult) - Positive if 3 or more points; Administer   PHQ-9 if positive 0        Chilo Yang at 8:00 AM on 1/4/2024

## 2024-01-04 NOTE — PROGRESS NOTES
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 Miranda on 1/12/2024. See Dr. Miranda's GYN note from 9/14/2023 for additional details. She has fibroids and chronic blood loss. She had parenteral iron infusion 10/13/2023 for significant iron deficient anemia. She had an EGD done 10/12/2023. She denies any anesthesia issues. She denies any surgical bleeding issues. She does not smoke nor abuse EtOH. She has excellent cardiopulmonary functional status. No other HEENT, cardiopulmonary, abdominal, , GYN, neurological, systemic, psychiatric, lymphatic, endocrine, vascular complaints.     Past Medical History:   Diagnosis Date    NO ACTIVE PROBLEMS      Past Surgical History:   Procedure Laterality Date    C/SECTION, LOW TRANSVERSE  01/16/2014    ESOPHAGOSCOPY, GASTROSCOPY, DUODENOSCOPY (EGD), COMBINED N/A 10/12/2023    Procedure: ESOPHAGOGASTRODUODENOSCOPY, WITH BIOPSY;  Surgeon: John Branham MD;  Location: [facility]       MR Pelvis (GYN) wo & w Contrast  Narrative: EXAMINATION: MR PELVIS (GYN) W/O & W CONTRAST, 10/5/2023 5:36 PM    COMPARISON: Ultrasound 11/20/2022 and MRI 5/22/2019    HISTORY: Uterine leiomyoma, unspecified location    TECHNIQUE: Multiplanar, multisequence imaging was obtained of the  pelvis without and with intravenous contrast. Contrast dose: 6mL  Gadavist    FINDINGS:     Uterus:  Large 10.4 x 9.6 x 11.3 cm fibroid in the anterior uterine body and  fundus deforming the endometrial canal, this previously measured 10.6  x 9.8 x 8.7 cm on 9/11/2023 and up to 7.3 cm x 20 2/20/2019. This  exerts mass effect on the adjacent structures, including the bladder  and colon. The endometrium measures up to 5 mm in greatest thickness.  Nabothian cysts.    Ovaries:  Normal size and appearance for patient age.  Multiple ovarian follicles.    Urinary bladder:   Minimal bladder wall thickening. No bladder mass.    Pelvic fluid:  Small amount of free fluid in the  cul-de-sac.    Lymph nodes:   No suspicious lymphadenopathy.    Bones:   No suspicious or aggressive appearing bone lesions. Degenerative  changes of the spine.    Other structures of the pelvis:  There are numerous prominent paraovarian vessels within the pelvis.   Impression: IMPRESSION:  Large intramural/submucosal uterine fibroid measuring up to 11.3 cm,  which has increased in size compared to prior MRI on 2/20/2017, when  it measured 7.3 cm. This deforms the endometrial canal and exerts  regional mass effect upon the regional anatomic structures, most  significantly involving the bladder.    I have personally reviewed the examination and initial interpretation  and I agree with the findings.    OMER LAWS,          SYSTEM ID:  [id number]    PE:    Vitals noted, gen, nad, cooperative, alert, neck supple nl rom, no B carotid bruits, lungs with good air movement, clear, no wheezing, RRR, S1. S2, no MRG, abdomen, no acute findings. Grossly normal neurological exam.     Results for orders placed or performed in visit on 01/04/24   Iron and iron binding capacity     Status: Abnormal   Result Value Ref Range    Iron 63 37 - 145 ug/dL    Iron Binding Capacity 501 (H) 240 - 430 ug/dL    Iron Sat Index 13 (L) 15 - 46 %   Ferritin     Status: Normal   Result Value Ref Range    Ferritin 16 6 - 175 ng/mL   Comprehensive metabolic panel     Status: Abnormal   Result Value Ref Range    Sodium 139 135 - 145 mmol/L    Potassium 3.9 3.4 - 5.3 mmol/L    Carbon Dioxide (CO2) 25 22 - 29 mmol/L    Anion Gap 9 7 - 15 mmol/L    Urea Nitrogen 7.3 6.0 - 20.0 mg/dL    Creatinine 0.66 0.51 - 0.95 mg/dL    GFR Estimate >90 >60 mL/min/1.73m2    Calcium 10.6 (H) 8.6 - 10.0 mg/dL    Chloride 105 98 - 107 mmol/L    Glucose 89 70 - 99 mg/dL    Alkaline Phosphatase 53 40 - 150 U/L    AST 19 0 - 45 U/L    ALT 11 0 - 50 U/L    Protein Total 7.7 6.4 - 8.3 g/dL    Albumin 4.2 3.5 - 5.2 g/dL    Bilirubin Total 0.2 <=1.2 mg/dL   CBC with  "platelets and differential     Status: Abnormal   Result Value Ref Range    WBC Count 4.4 4.0 - 11.0 10e3/uL    RBC Count 5.31 (H) 3.80 - 5.20 10e6/uL    Hemoglobin 14.3 11.7 - 15.7 g/dL    Hematocrit 44.9 35.0 - 47.0 %    MCV 85 78 - 100 fL    MCH 26.9 26.5 - 33.0 pg    MCHC 31.8 31.5 - 36.5 g/dL    RDW 17.3 (H) 10.0 - 15.0 %    Platelet Count 308 150 - 450 10e3/uL    % Neutrophils 49 %    % Lymphocytes 36 %    % Monocytes 9 %    % Eosinophils 5 %    % Basophils 1 %    % Immature Granulocytes 0 %    NRBCs per 100 WBC 0 <1 /100    Absolute Neutrophils 2.2 1.6 - 8.3 10e3/uL    Absolute Lymphocytes 1.6 0.8 - 5.3 10e3/uL    Absolute Monocytes 0.4 0.0 - 1.3 10e3/uL    Absolute Eosinophils 0.2 0.0 - 0.7 10e3/uL    Absolute Basophils 0.0 0.0 - 0.2 10e3/uL    Absolute Immature Granulocytes 0.0 <=0.4 10e3/uL    Absolute NRBCs 0.0 10e3/uL   CBC with platelets and differential     Status: Abnormal    Narrative    The following orders were created for panel order CBC with platelets and differential.  Procedure                               Abnormality         Status                     ---------                               -----------         ------                     CBC with platelets and d...[[id number]]  Abnormal            Final result                 Please view results for these tests on the individual orders.           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 She does not have any cardiopulmonary symptoms. She does not need any additional testing before surgery.     1. No COVID or pulmonary symptoms  2. Avoid ASA and NSAIDS before surgery  3. History of iron deficiency anemia. Ordered labs today. She is not taking iron nor is on a PPI.     30 minutes spent on the date of the encounter doing chart review, history and exam, documentation and further activities as noted above \"exclusive of procedures and other billable interpretations  "

## 2024-01-04 NOTE — PROGRESS NOTES
Surgeon (please enter first and last name): Meena Miranda MD   Fax number for Preop Evaluation: N/A  Location of Surgery:  [facility] 16  Date of Surgery: 01/12/2024  Procedure: HYSTERECTOMY, TOTAL, ROBOT-ASSISTED, with bilateral salpingectomy, cystoscopy   History of reaction to anesthesia? No    SUE Chase at 7:27 AM on 1/4/2024.

## 2024-01-06 ENCOUNTER — TELEPHONE (OUTPATIENT)
Dept: INTERNAL MEDICINE | Facility: CLINIC | Age: 44
End: 2024-01-06
Payer: COMMERCIAL

## 2024-01-06 DIAGNOSIS — D64.9 ANEMIA, UNSPECIFIED TYPE: Primary | ICD-10-CM

## 2024-01-06 NOTE — TELEPHONE ENCOUNTER
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 Let's get you through the surgery and recheck. Probably not a bad idea to start taking supplemental oral iron. I placed labs to recheck in about 3 months.    FAITH Crawford MD

## 2024-01-11 ENCOUNTER — ANESTHESIA EVENT (OUTPATIENT)
Dept: SURGERY | Facility: CLINIC | Age: 44
End: 2024-01-11
Payer: COMMERCIAL

## 2024-01-12 ENCOUNTER — ANESTHESIA (OUTPATIENT)
Dept: SURGERY | Facility: CLINIC | Age: 44
End: 2024-01-12
Payer: COMMERCIAL

## 2024-01-12 ENCOUNTER — HOSPITAL ENCOUNTER (OUTPATIENT)
Facility: CLINIC | Age: 44
Discharge: HOME | End: 2024-01-12
Attending: OBSTETRICS & GYNECOLOGY | Admitting: OBSTETRICS & GYNECOLOGY
Payer: COMMERCIAL

## 2024-01-12 ENCOUNTER — APPOINTMENT (OUTPATIENT)
Dept: GENERAL RADIOLOGY | Facility: CLINIC | Age: 44
End: 2024-01-12
Attending: OBSTETRICS & GYNECOLOGY
Payer: COMMERCIAL

## 2024-01-12 VITALS
HEART RATE: 69 BPM | RESPIRATION RATE: 12 BRPM | WEIGHT: 128.31 LBS | SYSTOLIC BLOOD PRESSURE: 130 MMHG | OXYGEN SATURATION: 100 % | DIASTOLIC BLOOD PRESSURE: 75 MMHG | TEMPERATURE: 97.3 F | BODY MASS INDEX: 20.09 KG/M2

## 2024-01-12 DIAGNOSIS — D25.9 UTERINE LEIOMYOMA, UNSPECIFIED LOCATION: ICD-10-CM

## 2024-01-12 DIAGNOSIS — Z90.710 STATUS POST LAPAROSCOPIC HYSTERECTOMY: Primary | ICD-10-CM

## 2024-01-12 LAB
ABO/RH(D): NORMAL
ANTIBODY SCREEN: NEGATIVE
HCG UR QL: NEGATIVE
RADIOLOGIST FLAGS: ABNORMAL
SPECIMEN EXPIRATION DATE: NORMAL

## 2024-01-12 PROCEDURE — 360N000080 HC SURGERY LEVEL 7, PER MIN: Performed by: OBSTETRICS & GYNECOLOGY

## 2024-01-12 PROCEDURE — 250N000011 HC RX IP 250 OP 636: Performed by: ANESTHESIOLOGY

## 2024-01-12 PROCEDURE — 999N000063 XR ABDOMEN PORT 1 VIEW

## 2024-01-12 PROCEDURE — 250N000011 HC RX IP 250 OP 636: Mod: JZ | Performed by: OBSTETRICS & GYNECOLOGY

## 2024-01-12 PROCEDURE — 81025 URINE PREGNANCY TEST: CPT | Performed by: OBSTETRICS & GYNECOLOGY

## 2024-01-12 PROCEDURE — 250N000009 HC RX 250: Performed by: NURSE ANESTHETIST, CERTIFIED REGISTERED

## 2024-01-12 PROCEDURE — 74018 RADEX ABDOMEN 1 VIEW: CPT | Mod: 26 | Performed by: RADIOLOGY

## 2024-01-12 PROCEDURE — 250N000025 HC SEVOFLURANE, PER MIN: Performed by: OBSTETRICS & GYNECOLOGY

## 2024-01-12 PROCEDURE — C1765 ADHESION BARRIER: HCPCS | Performed by: OBSTETRICS & GYNECOLOGY

## 2024-01-12 PROCEDURE — 999N000141 HC STATISTIC PRE-PROCEDURE NURSING ASSESSMENT: Performed by: OBSTETRICS & GYNECOLOGY

## 2024-01-12 PROCEDURE — 250N000011 HC RX IP 250 OP 636: Performed by: OBSTETRICS & GYNECOLOGY

## 2024-01-12 PROCEDURE — 710N000012 HC RECOVERY PHASE 2, PER MINUTE: Performed by: OBSTETRICS & GYNECOLOGY

## 2024-01-12 PROCEDURE — 370N000017 HC ANESTHESIA TECHNICAL FEE, PER MIN: Performed by: OBSTETRICS & GYNECOLOGY

## 2024-01-12 PROCEDURE — 250N000011 HC RX IP 250 OP 636: Performed by: NURSE ANESTHETIST, CERTIFIED REGISTERED

## 2024-01-12 PROCEDURE — 250N000013 HC RX MED GY IP 250 OP 250 PS 637: Performed by: ANESTHESIOLOGY

## 2024-01-12 PROCEDURE — 250N000013 HC RX MED GY IP 250 OP 250 PS 637: Performed by: OBSTETRICS & GYNECOLOGY

## 2024-01-12 PROCEDURE — 88307 TISSUE EXAM BY PATHOLOGIST: CPT | Mod: TC | Performed by: OBSTETRICS & GYNECOLOGY

## 2024-01-12 PROCEDURE — 86900 BLOOD TYPING SEROLOGIC ABO: CPT | Performed by: OBSTETRICS & GYNECOLOGY

## 2024-01-12 PROCEDURE — 710N000010 HC RECOVERY PHASE 1, LEVEL 2, PER MIN: Performed by: OBSTETRICS & GYNECOLOGY

## 2024-01-12 PROCEDURE — C9290 INJ, BUPIVACAINE LIPOSOME: HCPCS | Performed by: ANESTHESIOLOGY

## 2024-01-12 PROCEDURE — 272N000001 HC OR GENERAL SUPPLY STERILE: Performed by: OBSTETRICS & GYNECOLOGY

## 2024-01-12 PROCEDURE — 258N000003 HC RX IP 258 OP 636: Performed by: NURSE ANESTHETIST, CERTIFIED REGISTERED

## 2024-01-12 PROCEDURE — 88307 TISSUE EXAM BY PATHOLOGIST: CPT | Mod: 26 | Performed by: PATHOLOGY

## 2024-01-12 PROCEDURE — 58573 TLH W/T/O UTERUS OVER 250 G: CPT | Mod: GC | Performed by: OBSTETRICS & GYNECOLOGY

## 2024-01-12 RX ORDER — FENTANYL CITRATE 50 UG/ML
25-50 INJECTION, SOLUTION INTRAMUSCULAR; INTRAVENOUS
Status: DISCONTINUED | OUTPATIENT
Start: 2024-01-12 | End: 2024-01-12 | Stop reason: HOSPADM

## 2024-01-12 RX ORDER — HYDROMORPHONE HYDROCHLORIDE 1 MG/ML
0.2 INJECTION, SOLUTION INTRAMUSCULAR; INTRAVENOUS; SUBCUTANEOUS EVERY 5 MIN PRN
Status: DISCONTINUED | OUTPATIENT
Start: 2024-01-12 | End: 2024-01-12 | Stop reason: HOSPADM

## 2024-01-12 RX ORDER — ONDANSETRON 2 MG/ML
INJECTION INTRAMUSCULAR; INTRAVENOUS PRN
Status: DISCONTINUED | OUTPATIENT
Start: 2024-01-12 | End: 2024-01-12

## 2024-01-12 RX ORDER — ONDANSETRON 2 MG/ML
4 INJECTION INTRAMUSCULAR; INTRAVENOUS EVERY 30 MIN PRN
Status: DISCONTINUED | OUTPATIENT
Start: 2024-01-12 | End: 2024-01-12 | Stop reason: HOSPADM

## 2024-01-12 RX ORDER — ONDANSETRON 4 MG/1
4 TABLET, FILM COATED ORAL EVERY 6 HOURS PRN
Qty: 20 TABLET | Refills: 0 | Status: SHIPPED | OUTPATIENT
Start: 2024-01-12 | End: 2024-02-08

## 2024-01-12 RX ORDER — ONDANSETRON 4 MG/1
4 TABLET, ORALLY DISINTEGRATING ORAL EVERY 30 MIN PRN
Status: DISCONTINUED | OUTPATIENT
Start: 2024-01-12 | End: 2024-01-12 | Stop reason: HOSPADM

## 2024-01-12 RX ORDER — NALOXONE HYDROCHLORIDE 0.4 MG/ML
0.4 INJECTION, SOLUTION INTRAMUSCULAR; INTRAVENOUS; SUBCUTANEOUS
Status: DISCONTINUED | OUTPATIENT
Start: 2024-01-12 | End: 2024-01-12 | Stop reason: HOSPADM

## 2024-01-12 RX ORDER — METRONIDAZOLE 500 MG/100ML
500 INJECTION, SOLUTION INTRAVENOUS ONCE
Status: COMPLETED | OUTPATIENT
Start: 2024-01-12 | End: 2024-01-12

## 2024-01-12 RX ORDER — CEFAZOLIN SODIUM/WATER 2 G/20 ML
2 SYRINGE (ML) INTRAVENOUS SEE ADMIN INSTRUCTIONS
Status: DISCONTINUED | OUTPATIENT
Start: 2024-01-12 | End: 2024-01-12 | Stop reason: HOSPADM

## 2024-01-12 RX ORDER — FENTANYL CITRATE 50 UG/ML
INJECTION, SOLUTION INTRAMUSCULAR; INTRAVENOUS PRN
Status: DISCONTINUED | OUTPATIENT
Start: 2024-01-12 | End: 2024-01-12

## 2024-01-12 RX ORDER — KETOROLAC TROMETHAMINE 30 MG/ML
INJECTION, SOLUTION INTRAMUSCULAR; INTRAVENOUS PRN
Status: DISCONTINUED | OUTPATIENT
Start: 2024-01-12 | End: 2024-01-12

## 2024-01-12 RX ORDER — NALOXONE HYDROCHLORIDE 0.4 MG/ML
0.2 INJECTION, SOLUTION INTRAMUSCULAR; INTRAVENOUS; SUBCUTANEOUS
Status: DISCONTINUED | OUTPATIENT
Start: 2024-01-12 | End: 2024-01-12 | Stop reason: HOSPADM

## 2024-01-12 RX ORDER — SODIUM CHLORIDE, SODIUM LACTATE, POTASSIUM CHLORIDE, CALCIUM CHLORIDE 600; 310; 30; 20 MG/100ML; MG/100ML; MG/100ML; MG/100ML
INJECTION, SOLUTION INTRAVENOUS CONTINUOUS
Status: DISCONTINUED | OUTPATIENT
Start: 2024-01-12 | End: 2024-01-12 | Stop reason: HOSPADM

## 2024-01-12 RX ORDER — IBUPROFEN 200 MG
600 TABLET ORAL EVERY 6 HOURS PRN
COMMUNITY
Start: 2024-01-12 | End: 2024-02-08

## 2024-01-12 RX ORDER — PHENAZOPYRIDINE HYDROCHLORIDE 200 MG/1
200 TABLET, FILM COATED ORAL ONCE
Status: COMPLETED | OUTPATIENT
Start: 2024-01-12 | End: 2024-01-12

## 2024-01-12 RX ORDER — OXYCODONE HYDROCHLORIDE 5 MG/1
5 TABLET ORAL
Status: COMPLETED | OUTPATIENT
Start: 2024-01-12 | End: 2024-01-12

## 2024-01-12 RX ORDER — AMOXICILLIN 250 MG
1-2 CAPSULE ORAL 2 TIMES DAILY
Qty: 30 TABLET | Refills: 0 | Status: SHIPPED | OUTPATIENT
Start: 2024-01-12 | End: 2024-02-08

## 2024-01-12 RX ORDER — FLUMAZENIL 0.1 MG/ML
0.2 INJECTION, SOLUTION INTRAVENOUS
Status: DISCONTINUED | OUTPATIENT
Start: 2024-01-12 | End: 2024-01-12 | Stop reason: HOSPADM

## 2024-01-12 RX ORDER — CEFAZOLIN SODIUM/WATER 2 G/20 ML
2 SYRINGE (ML) INTRAVENOUS
Status: COMPLETED | OUTPATIENT
Start: 2024-01-12 | End: 2024-01-12

## 2024-01-12 RX ORDER — LIDOCAINE HYDROCHLORIDE 20 MG/ML
INJECTION, SOLUTION INFILTRATION; PERINEURAL PRN
Status: DISCONTINUED | OUTPATIENT
Start: 2024-01-12 | End: 2024-01-12

## 2024-01-12 RX ORDER — KETOROLAC TROMETHAMINE 30 MG/ML
15 INJECTION, SOLUTION INTRAMUSCULAR; INTRAVENOUS ONCE
Status: COMPLETED | OUTPATIENT
Start: 2024-01-12 | End: 2024-01-12

## 2024-01-12 RX ORDER — SODIUM CHLORIDE, SODIUM LACTATE, POTASSIUM CHLORIDE, CALCIUM CHLORIDE 600; 310; 30; 20 MG/100ML; MG/100ML; MG/100ML; MG/100ML
INJECTION, SOLUTION INTRAVENOUS CONTINUOUS PRN
Status: DISCONTINUED | OUTPATIENT
Start: 2024-01-12 | End: 2024-01-12

## 2024-01-12 RX ORDER — PROPOFOL 10 MG/ML
INJECTION, EMULSION INTRAVENOUS PRN
Status: DISCONTINUED | OUTPATIENT
Start: 2024-01-12 | End: 2024-01-12

## 2024-01-12 RX ORDER — PROPOFOL 10 MG/ML
INJECTION, EMULSION INTRAVENOUS CONTINUOUS PRN
Status: DISCONTINUED | OUTPATIENT
Start: 2024-01-12 | End: 2024-01-12

## 2024-01-12 RX ORDER — ACETAMINOPHEN 325 MG/1
975 TABLET ORAL ONCE
Status: COMPLETED | OUTPATIENT
Start: 2024-01-12 | End: 2024-01-12

## 2024-01-12 RX ORDER — OXYCODONE HYDROCHLORIDE 5 MG/1
5-10 TABLET ORAL EVERY 4 HOURS PRN
Qty: 6 TABLET | Refills: 0 | Status: SHIPPED | OUTPATIENT
Start: 2024-01-12 | End: 2024-02-08

## 2024-01-12 RX ORDER — FENTANYL CITRATE 50 UG/ML
25 INJECTION, SOLUTION INTRAMUSCULAR; INTRAVENOUS EVERY 5 MIN PRN
Status: DISCONTINUED | OUTPATIENT
Start: 2024-01-12 | End: 2024-01-12 | Stop reason: HOSPADM

## 2024-01-12 RX ORDER — LABETALOL HYDROCHLORIDE 5 MG/ML
10 INJECTION, SOLUTION INTRAVENOUS
Status: DISCONTINUED | OUTPATIENT
Start: 2024-01-12 | End: 2024-01-12 | Stop reason: HOSPADM

## 2024-01-12 RX ORDER — TRANEXAMIC ACID 10 MG/ML
1 INJECTION, SOLUTION INTRAVENOUS ONCE
Status: DISCONTINUED | OUTPATIENT
Start: 2024-01-12 | End: 2024-01-12 | Stop reason: HOSPADM

## 2024-01-12 RX ORDER — OXYCODONE HYDROCHLORIDE 10 MG/1
10 TABLET ORAL
Status: DISCONTINUED | OUTPATIENT
Start: 2024-01-12 | End: 2024-01-12 | Stop reason: HOSPADM

## 2024-01-12 RX ORDER — HYDROMORPHONE HYDROCHLORIDE 1 MG/ML
0.4 INJECTION, SOLUTION INTRAMUSCULAR; INTRAVENOUS; SUBCUTANEOUS EVERY 5 MIN PRN
Status: DISCONTINUED | OUTPATIENT
Start: 2024-01-12 | End: 2024-01-12 | Stop reason: HOSPADM

## 2024-01-12 RX ORDER — ACETAMINOPHEN 500 MG
1000 TABLET ORAL EVERY 6 HOURS PRN
COMMUNITY
Start: 2024-01-12 | End: 2024-02-08

## 2024-01-12 RX ORDER — DEXMEDETOMIDINE HYDROCHLORIDE 4 UG/ML
INJECTION, SOLUTION INTRAVENOUS PRN
Status: DISCONTINUED | OUTPATIENT
Start: 2024-01-12 | End: 2024-01-12

## 2024-01-12 RX ORDER — FENTANYL CITRATE 50 UG/ML
50 INJECTION, SOLUTION INTRAMUSCULAR; INTRAVENOUS EVERY 5 MIN PRN
Status: DISCONTINUED | OUTPATIENT
Start: 2024-01-12 | End: 2024-01-12 | Stop reason: HOSPADM

## 2024-01-12 RX ORDER — IBUPROFEN 200 MG
800 TABLET ORAL ONCE
Status: DISCONTINUED | OUTPATIENT
Start: 2024-01-12 | End: 2024-01-12 | Stop reason: HOSPADM

## 2024-01-12 RX ORDER — BUPIVACAINE HYDROCHLORIDE 2.5 MG/ML
INJECTION, SOLUTION EPIDURAL; INFILTRATION; INTRACAUDAL
Status: COMPLETED | OUTPATIENT
Start: 2024-01-12 | End: 2024-01-12

## 2024-01-12 RX ORDER — DEXAMETHASONE SODIUM PHOSPHATE 4 MG/ML
INJECTION, SOLUTION INTRA-ARTICULAR; INTRALESIONAL; INTRAMUSCULAR; INTRAVENOUS; SOFT TISSUE PRN
Status: DISCONTINUED | OUTPATIENT
Start: 2024-01-12 | End: 2024-01-12

## 2024-01-12 RX ADMIN — ONDANSETRON 4 MG: 2 INJECTION INTRAMUSCULAR; INTRAVENOUS at 17:37

## 2024-01-12 RX ADMIN — HYDROMORPHONE HYDROCHLORIDE 0.25 MG: 1 INJECTION, SOLUTION INTRAMUSCULAR; INTRAVENOUS; SUBCUTANEOUS at 11:29

## 2024-01-12 RX ADMIN — Medication 20 MG: at 10:30

## 2024-01-12 RX ADMIN — METRONIDAZOLE 500 MG: 500 INJECTION, SOLUTION INTRAVENOUS at 06:59

## 2024-01-12 RX ADMIN — FENTANYL CITRATE 25 MCG: 50 INJECTION INTRAMUSCULAR; INTRAVENOUS at 09:50

## 2024-01-12 RX ADMIN — OXYCODONE HYDROCHLORIDE 2.5 MG: 5 TABLET ORAL at 18:26

## 2024-01-12 RX ADMIN — SUGAMMADEX 80 MG: 100 INJECTION, SOLUTION INTRAVENOUS at 11:45

## 2024-01-12 RX ADMIN — PROPOFOL 75 MCG/KG/MIN: 10 INJECTION, EMULSION INTRAVENOUS at 07:30

## 2024-01-12 RX ADMIN — Medication 2 G: at 07:51

## 2024-01-12 RX ADMIN — BUPIVACAINE 20 ML: 13.3 INJECTION, SUSPENSION, LIPOSOMAL INFILTRATION at 07:45

## 2024-01-12 RX ADMIN — FENTANYL CITRATE 25 MCG: 50 INJECTION INTRAMUSCULAR; INTRAVENOUS at 09:09

## 2024-01-12 RX ADMIN — PROPOFOL 150 MG: 10 INJECTION, EMULSION INTRAVENOUS at 07:28

## 2024-01-12 RX ADMIN — Medication 20 MG: at 08:43

## 2024-01-12 RX ADMIN — PROPOFOL 50 MG: 10 INJECTION, EMULSION INTRAVENOUS at 07:30

## 2024-01-12 RX ADMIN — Medication 20 MG: at 09:34

## 2024-01-12 RX ADMIN — PHENYLEPHRINE HYDROCHLORIDE 50 MCG: 10 INJECTION INTRAVENOUS at 08:18

## 2024-01-12 RX ADMIN — SUGAMMADEX 120 MG: 100 INJECTION, SOLUTION INTRAVENOUS at 11:32

## 2024-01-12 RX ADMIN — ONDANSETRON 4 MG: 2 INJECTION INTRAMUSCULAR; INTRAVENOUS at 11:22

## 2024-01-12 RX ADMIN — Medication 2 G: at 11:58

## 2024-01-12 RX ADMIN — BUPIVACAINE HYDROCHLORIDE 20 ML: 2.5 INJECTION, SOLUTION EPIDURAL; INFILTRATION; INTRACAUDAL at 07:45

## 2024-01-12 RX ADMIN — OXYCODONE HYDROCHLORIDE 2.5 MG: 5 TABLET ORAL at 15:00

## 2024-01-12 RX ADMIN — DEXAMETHASONE SODIUM PHOSPHATE 8 MG: 4 INJECTION, SOLUTION INTRA-ARTICULAR; INTRALESIONAL; INTRAMUSCULAR; INTRAVENOUS; SOFT TISSUE at 08:03

## 2024-01-12 RX ADMIN — KETOROLAC TROMETHAMINE 15 MG: 30 INJECTION, SOLUTION INTRAMUSCULAR at 11:30

## 2024-01-12 RX ADMIN — PHENAZOPYRIDINE 200 MG: 200 TABLET ORAL at 06:54

## 2024-01-12 RX ADMIN — HYDROMORPHONE HYDROCHLORIDE 0.25 MG: 1 INJECTION, SOLUTION INTRAMUSCULAR; INTRAVENOUS; SUBCUTANEOUS at 12:19

## 2024-01-12 RX ADMIN — HYDROMORPHONE HYDROCHLORIDE 0.2 MG: 1 INJECTION, SOLUTION INTRAMUSCULAR; INTRAVENOUS; SUBCUTANEOUS at 12:56

## 2024-01-12 RX ADMIN — DEXMEDETOMIDINE 8 MCG: 100 INJECTION, SOLUTION, CONCENTRATE INTRAVENOUS at 08:09

## 2024-01-12 RX ADMIN — SODIUM CHLORIDE, POTASSIUM CHLORIDE, SODIUM LACTATE AND CALCIUM CHLORIDE: 600; 310; 30; 20 INJECTION, SOLUTION INTRAVENOUS at 07:26

## 2024-01-12 RX ADMIN — DEXMEDETOMIDINE 4 MCG: 100 INJECTION, SOLUTION, CONCENTRATE INTRAVENOUS at 11:07

## 2024-01-12 RX ADMIN — ACETAMINOPHEN 975 MG: 325 TABLET, FILM COATED ORAL at 06:54

## 2024-01-12 RX ADMIN — Medication 50 MG: at 07:28

## 2024-01-12 RX ADMIN — ACETAMINOPHEN 975 MG: 325 TABLET, FILM COATED ORAL at 14:59

## 2024-01-12 RX ADMIN — FENTANYL CITRATE 50 MCG: 50 INJECTION INTRAMUSCULAR; INTRAVENOUS at 08:12

## 2024-01-12 RX ADMIN — TRANEXAMIC ACID 1 G: 1 INJECTION, SOLUTION INTRAVENOUS at 10:52

## 2024-01-12 RX ADMIN — DEXMEDETOMIDINE 8 MCG: 100 INJECTION, SOLUTION, CONCENTRATE INTRAVENOUS at 09:50

## 2024-01-12 RX ADMIN — LIDOCAINE HYDROCHLORIDE 100 MG: 20 INJECTION, SOLUTION INFILTRATION; PERINEURAL at 07:28

## 2024-01-12 RX ADMIN — ONDANSETRON 4 MG: 2 INJECTION INTRAMUSCULAR; INTRAVENOUS at 13:02

## 2024-01-12 RX ADMIN — KETOROLAC TROMETHAMINE 15 MG: 30 INJECTION, SOLUTION INTRAMUSCULAR; INTRAVENOUS at 13:50

## 2024-01-12 ASSESSMENT — ACTIVITIES OF DAILY LIVING (ADL)
ADLS_ACUITY_SCORE: 29

## 2024-01-12 NOTE — ANESTHESIA PREPROCEDURE EVALUATION
Anesthesia Pre-Procedure Evaluation    Patient: Sakina Bowling   MRN: [id number] : 1980        Procedure : Procedure(s):  HYSTERECTOMY, TOTAL, ROBOT-ASSISTED, WITH BILATERAL SALPINGECTOMY, CYSTOSCOPY          Past Medical History:   Diagnosis Date     NO ACTIVE PROBLEMS       Past Surgical History:   Procedure Laterality Date     C/SECTION, LOW TRANSVERSE  2014     ESOPHAGOSCOPY, GASTROSCOPY, DUODENOSCOPY (EGD), COMBINED N/A 10/12/2023    Procedure: ESOPHAGOGASTRODUODENOSCOPY, WITH BIOPSY;  Surgeon: John Branham MD;  Location:  GI      Allergies   Allergen Reactions     Bactrim [Sulfamethoxazole-Trimethoprim] Rash      Social History     Tobacco Use     Smoking status: Never     Smokeless tobacco: Never   Substance Use Topics     Alcohol use: Yes     Comment: 1 every two months      Wt Readings from Last 1 Encounters:   24 58.2 kg (128 lb 4.9 oz)        Anesthesia Evaluation   Pt has had prior anesthetic.         ROS/MED HX  ENT/Pulmonary:       Neurologic:       Cardiovascular:       METS/Exercise Tolerance:     Hematologic: Comments: Fe def anemai due to chronic blood loss.    (+)      anemia,          Musculoskeletal:       GI/Hepatic:       Renal/Genitourinary: Comment: Intramural leiomyoma of the uterus.      Endo:       Psychiatric/Substance Use:       Infectious Disease:       Malignancy:       Other:            Physical Exam    Airway        Mallampati: I   TM distance: > 3 FB   Neck ROM: full   Mouth opening: > 3 cm    Respiratory Devices and Support         Dental           Cardiovascular   cardiovascular exam normal          Pulmonary   pulmonary exam normal            OUTSIDE LABS:  CBC:   Lab Results   Component Value Date    WBC 4.4 2024    WBC 8.1 2023    HGB 14.3 2024    HGB 12.0 2023    HCT 44.9 2024    HCT 38.8 2023     2024     2023     BMP:   Lab Results   Component Value Date     2024      "02/02/2022    POTASSIUM 3.9 01/04/2024    POTASSIUM 3.9 02/02/2022    CHLORIDE 105 01/04/2024    CHLORIDE 105 02/02/2022    CO2 25 01/04/2024    CO2 28 02/02/2022    BUN 7.3 01/04/2024    BUN 14 02/02/2022    CR 0.66 01/04/2024    CR 0.69 02/02/2022    GLC 89 01/04/2024    GLC 87 02/02/2022     COAGS: No results found for: \"PTT\", \"INR\", \"FIBR\"  POC: No results found for: \"BGM\", \"HCG\", \"HCGS\"  HEPATIC:   Lab Results   Component Value Date    ALBUMIN 4.2 01/04/2024    PROTTOTAL 7.7 01/04/2024    ALT 11 01/04/2024    AST 19 01/04/2024    ALKPHOS 53 01/04/2024    BILITOTAL 0.2 01/04/2024     OTHER:   Lab Results   Component Value Date    ODILIA 10.6 (H) 01/04/2024    TSH 1.99 10/10/2023       Anesthesia Plan    ASA Status:  1    NPO Status:  NPO Appropriate    Anesthesia Type: General.     - Airway: ETT   Induction: Intravenous.   Maintenance: Balanced.   Techniques and Equipment:     - Lines/Monitors: 2nd IV     Consents    Anesthesia Plan(s) and associated risks, benefits, and realistic alternatives discussed. Questions answered and patient/representative(s) expressed understanding.     - Discussed: Risks, Benefits and Alternatives for BOTH SEDATION and the PROCEDURE were discussed     - Discussed with:  Patient      - Extended Intubation/Ventilatory Support Discussed: No.      - Patient is DNR/DNI Status: No     Use of blood products discussed: No .     Postoperative Care    Pain management: IV analgesics, Oral pain medications.   PONV prophylaxis: Ondansetron (or other 5HT-3), Dexamethasone or Solumedrol     Comments:               Katia Victoria MD    I have reviewed the pertinent notes and labs in the chart from the past 30 days and (re)examined the patient.  Any updates or changes from those notes are reflected in this note.      # Hypercalcemia: Highest Ca = 10.6 mg/dL in last 30 days, will monitor as appropriate             "

## 2024-01-12 NOTE — ANESTHESIA POSTPROCEDURE EVALUATION
Patient: Sakina Bowling    Procedure: Procedure(s):  HYSTERECTOMY, TOTAL, ROBOT-ASSISTED, WITH BILATERAL SALPINGECTOMY, CYSTOSCOPY       Anesthesia Type:  General    Note:  Disposition: Outpatient   Postop Pain Control: Uneventful            Sign Out: Well controlled pain   PONV: Yes   Neuro/Psych: Uneventful            Sign Out: Acceptable/Baseline neuro status   Airway/Respiratory: Uneventful            Sign Out: Acceptable/Baseline resp. status   CV/Hemodynamics: Uneventful            Sign Out: Acceptable CV status; No obvious hypovolemia; No obvious fluid overload   Other NRE: NONE   DID A NON-ROUTINE EVENT OCCUR? No    Event details/Postop Comments:  Pt seen in PACU.  More awake and comfortable now.  VSS.  Tolerating po.  Discharge to Phase 2.       Last vitals:  Vitals Value Taken Time   /70 01/12/24 1345   Temp 36.3  C (97.3  F) 01/12/24 1315   Pulse 77 01/12/24 1357   Resp 19 01/12/24 1357   SpO2 99 % 01/12/24 1357   Vitals shown include unfiled device data.    Electronically Signed By: Katia Victoria MD  January 12, 2024  2:23 PM

## 2024-01-12 NOTE — BRIEF OP NOTE
[facility]    Brief Operative Note    Pre-operative diagnosis: Uterine leiomyoma, unspecified location [D25.9]  Post-operative diagnosis Same as pre-operative diagnosis, s/p procedure below    Procedure: HYSTERECTOMY, TOTAL, ROBOT-ASSISTED, WITH BILATERAL SALPINGECTOMY, CYSTOSCOPY, Bilateral - Abdomen    Surgeon: Surgeon(s) and Role:     * Meena Miranda MD - Primary     * Octaviano Hogan MD - Resident - Assisting  Anesthesia: General   Estimated Blood Loss: 200 ml   IVF: 1500 ml crystalloid  UOP: 200 ml     Drains: None  Specimens:   ID Type Source Tests Collected by Time Destination   1 :  Tissue Uterus, Cervix, Bilateral Fallopian Tubes SURGICAL PATHOLOGY EXAM Meena Miranda MD 1/12/2024 11:05 AM      Findings: Exam under anesthesia revealed an approximately 14-week size uterus.  Mobile, without adnexal masses.  Entrance into the abdomen was performed with the open technique down through the umbilicus.  A small fascial hernia was identified and extended bilaterally with James scissors.  Entrance into the peritoneal cavity was confirmed and a gel point port was placed.  Insufflation with gas had an opening pressure approximately 6 mmHg.  On initial examination of the abdomen with the laparoscope there was no evidence of trauma beneath her initial site of entry.  Abdominal survey also revealed a normal leading liver edge, stomach edge, bilateral anterior diaphragm.  Large fibroid uterus with normal-appearing bilateral fallopian tubes and ovaries.  The hysterectomy bilateral salpingectomy was performed without complication.  The vaginal cuff was closed with 2-0 V-Loc suture.  All surgical sites were hemostatic at the end of the case.  The specimen was removed with a specimen retrieval bag through the gel point port.  The specimen was then morcellated.  All specimens including uterus, cervix, bilateral fallopian tubes were sent together for permanent pathology.   The fascia from the gel point incision was closed with oh V-Loc suture in a running fashion.  All skin incisions were closed with 4-0 Monocryl.  There was concern for a broken needle tip at the umbilical site and an x-ray was performed which did not reveal any evidence of a retained foreign body.  All surgical skin incisions were closed with Dermabond.    Complications: None    Octaviano Hogan MD  OBGYN PGY-4  January 12, 2024 11:47 AM     .

## 2024-01-12 NOTE — DISCHARGE INSTRUCTIONS

## 2024-01-12 NOTE — ANESTHESIA PROCEDURE NOTES
Airway       Patient location during procedure: OR       Procedure Start/Stop Times: 1/12/2024 7:32 AM  Staff -        CRNA: Rosmery Oshea APRN CRNA       Performed By: CRNA  Consent for Airway        Urgency: elective  Indications and Patient Condition       Indications for airway management: myra-procedural       Induction type:intravenous       Mask difficulty assessment: 1 - vent by mask    Final Airway Details       Final airway type: endotracheal airway       Successful airway: ETT - single  Endotracheal Airway Details        ETT size (mm): 7.0       Cuffed: yes       Cuff volume (mL): 5       Successful intubation technique: direct laryngoscopy       DL Blade Type: Guy 2       Grade View of Cords: 1       Position: Right       Measured from: lips       Secured at (cm): 22       Bite block used: None    Post intubation assessment        Placement verified by: capnometry and equal breath sounds        Number of attempts at approach: 1       Secured with: silk tape       Ease of procedure: easy       Dentition: Intact and Unchanged       Dental guard used and removed.    Medication(s) Administered   Medication Administration Time: 1/12/2024 7:32 AM

## 2024-01-12 NOTE — ANESTHESIA PROCEDURE NOTES
TAP Procedure Note    Pre-Procedure   Staff -        Anesthesiologist:  Emely Anthony MD       Resident/Fellow: Oli Ballesteros MD       Performed By: resident and with residents       Procedure performed by resident/fellow/CRNA in presence of a teaching physician.         Location: OR       Procedure Start/Stop Times: 1/12/2024 7:38 AM and 1/12/2024 7:44 AM       Pre-Anesthestic Checklist: patient identified, IV checked, site marked, risks and benefits discussed, informed consent, monitors and equipment checked, pre-op evaluation, at physician/surgeon's request and post-op pain management  Timeout:       Correct Patient: Yes        Correct Procedure: Yes        Correct Site: Yes        Correct Position: Yes        Correct Laterality: Yes        Site Marked: Yes  Procedure Documentation  Procedure: TAP       Diagnosis: POST OPERATIVE PAIN       Laterality: bilateral       Patient Position: supine       Patient Prep/Sterile Barriers: sterile gloves, mask       Skin prep: Chloraprep       Needle Type: short bevel       Needle Gauge: 21.        Needle Length (millimeters): 110        Ultrasound guided       1. Ultrasound was used to identify targeted nerve, plexus, vascular marker, or fascial plane and place a needle adjacent to it in real-time.       2. Ultrasound was used to visualize the spread of anesthetic in close proximity to the above referenced structure.       3. A permanent image is entered into the patient's record.    Assessment/Narrative         The placement was negative for: blood aspirated, painful injection and site bleeding       Paresthesias: No.       Bolus given via needle..        Secured via.        Insertion/Infusion Method: Single Shot       Complications: none       Injection made incrementally with aspirations every 5 mL.    Medication(s) Administered   Bupivacaine 0.25% PF (Infiltration) - Infiltration   20 mL - 1/12/2024 7:45:00 AM  Bupivacaine liposome (Exparel) 1.3% LA inj  "susp (Infiltration) - Infiltration   20 mL - 1/12/2024 7:45:00 AM  Medication Administration Time: 1/12/2024 7:38 AM     Comments:  Discussed risks of nerve block, including nerve injury, bleeding, infection. Discussed anticipated incomplete analgesia. Discussed alternative of not performing a nerve block. Ensured understanding, invited questions and all questions were answered. Patient wishes to proceed.    Informed consent was obtained.   Patient under general anesthesia, as discussed. Incremental aspiration every 5 mL. No paresthesia, no heme. Needle tip visualized throughout with appropriate spread of local anesthetic in fascial planes bilaterally.  Block was placed at the surgeon's request for post operative pain control.          FOR [facility] ([facility] [place] ONLY:   Pain Team Contact information: please page the Pain Team Via [employer]. Search \"Pain\". During daytime hours, please page the attending first. At night please page the resident first.      "

## 2024-01-13 NOTE — OP NOTE
INDICATIONS FOR PROCEDURE: 43 year old yo woman with symptomatic fibroid uterus with abnormal uterine bleeding and urinary frequency elects to proceed with robot assisted total laparoscopic hysterectomy and bilateral salpingectomy.    PREOPERATIVE DIAGNOSIS:  Fibroid uterus, abnormal uterine bleeding    POSTOPERATIVE DIAGNOSIS:  same     PROCEDURE:  Robot assisted total laparoscopic hysterectomy and bilateral salpingectomy, cystoscopy    SURGEON:  Meena Miranda MD     ASSISTANTS:  Octaviano Hogan MD PGY-4     ANESTHESIA:  General endotracheal with TAP block    ESTIMATED BLOOD LOSS:  200 cc.     IV FLUIDS:  1500 mL of LR.  No transfusion was given.     URINE OUTPUT: 200 mL.     DRAINS IN PLACE AT END OF PROCEDURE: none.     SPECIMENS:  Included uterus, cervix and bilateral fallopian tubes.     FINDINGS:  Exam under anesthesia revealed an approximately 14-week size uterus.  Mobile, without adnexal masses.  Entrance into the abdomen was performed with the open technique down through the umbilicus.  A small fascial hernia was identified and extended bilaterally with James scissors.  Entrance into the peritoneal cavity was confirmed and a gel point port was placed.  Insufflation with gas had an opening pressure approximately 6 mmHg.  On initial examination of the abdomen with the laparoscope there was no evidence of trauma beneath her initial site of entry.  Abdominal survey also revealed a leading liver edge with a mildly fibrotic appearance, normal stomach edge and bilateral anterior diaphragm.  Large fibroid uterus with normal-appearing bilateral fallopian tubes and ovaries.  The hysterectomy bilateral salpingectomy was performed without complication.  The vaginal cuff was closed with 2-0 V-Loc suture.  All surgical sites were hemostatic at the end of the case.  The specimen was removed with a specimen retrieval bag through the gel point port.  The specimen was then morcellated.  All specimens including uterus, cervix,  bilateral fallopian tubes were sent together for permanent pathology.  The fascia from the gel point incision was closed with 0 Vicryl suture in a running fashion.  All skin incisions were closed with 4-0 Monocryl.  There was concern for a broken needle tip at the umbilical site and an x-ray was performed which did not reveal any evidence of a retained foreign body.  All surgical skin incisions were closed with Dermabond.     COMPLICATIONS:  None.     CONDITION:  Stable.     DETAILS OF PROCEDURE:  The patient was brought to the operating room after reviewing the risks, benefits and alternatives to the procedure.  She was positioned in lithotomy with both arms tucked and prepped and draped in the usual sterile fashion.  She received ancef and flagyl for preoperative antibiotics and had SCDs in place.  The procedure began vaginally, where the cervix was dilated and sounded to 10.5 cm and the modified KOH manipulator/Advincula Arch was placed and attached to the uterine positioning system.  A Gill catheter was placed in the  bladder.  Procedure then turned to the abdomen.  An omega incision was made at the umbilicus, measuring 3cm in total length.  The subcutaneous tissue was divided with blunt dissection and electrocautery.  The fascia was identified.  A small defect in the fascia was noted at this location, approximately 4mm.  This was grasped and elevated and the fascia opened the length of the incision.  The gel point ulysses was placed and there was no evidence of trauma from the abdominal entry.  The specimen retrieval bag was placed in the abdomen through this incision.  The gel point was attached with a robotic port and a 12mm assistant port in place.  Insufflation was initiated.  Once insufflation was completed, the remaining 2 robotic ports and the airseal assistant port were placed under direct viualization.      The procedure then began with lysis of adhesion of the omentum from the anterior abdominal wall  and the anterior surface of the uterus.  Then the hysterectomy began on the right side.  The right round ligament was identified, cauterized and divided and opening up anteriorly to create the beginning of the bladder flap. The posterior leaf of the broad ligament was opened. Then the right fallopian tube was elevated and the mesosalpinx cauterized and divided.  The right fallopian tube was amputated at the cornua and removed from the abdomen.  The right utero-ovarian ligament was then cauterized and divided after identifying  the ureter, far from the operative field.  This allowed further opening of the posterior leaf of the broad ligament and skeletonization of the right uterine artery. Posterior branches of the uterine artery were cauterized.  The dissection of the bladder on the right was difficult due to adhesions from prior  section.  We then turned to the left side.  Again the left round  ligament was cauterized and divided and opened anteriorly to complete the initial beginning of the bladder flap.  The bladder flap was completed to be below the level of the colpotomy cup pm the left side.  We then elevated the right fallopian tube with mesosalpinx underlying.  It was cauterized and transected, and amputated at the cornua.  It was handed off as part of the final specimen. Visualization of the posterior uterus and cul-de-sac was much easier than anteriorly.  The left ureter was clearly identified outside of the operative field. The posterior leaf of the left broad ligament was divided to the level of the colpotomy cup and the left uterine artery was cauterized and divided.  There was bleeding from the left uterine artery which was controlled with bipolar cautery.  At this point we turned back to the right side to complete the bladder flap and skeletonization of the right ureter.  Dissection of the remaining bladder flap was challenging due to the degree of adhesions and the large anterior fibroid.   Careful dissection was assisted with the use of the 30 degree scope and manipulation of the uterine positioning device.  Once the bladder flap was completed the right uterine artery was cauterized and divided.      At this point, then, the colpotomy was created beginning posteriorly.  Anteriorly the colpotomy was completed, first on the left and then the right.  The uterine manipulator was removed and a balloon place in the vagina. The uterus and cervix were placed in the upper abdomen for later removal.  The vaginal cuff was closed with 2-0 v-lock beginning in the right corner and running to the left corner and doubling back past the midline.  The cuff was inspected and found to be hemostatic.      We then placed the specimen into the retrieval bag.  Instruments were removed from the abdomen and the specimen retrieval bag was brought to the Reilly site at the umbilicus.  The abdomen was desufflated and the robot undocked.  The uterus was morcellated within the bag.  There was no damage to the bag and the entire specimen was morcellated and sent to pathology.  We then again insufflated the abdomen.  The camera was introduced and the pedicles inspected.  They were found to be hemostatic.  The ureters were visualized, vermiculating.  Due the the extensive dissection required for the bladder flap, decision was made to proceed with cystoscopy. The gas was allowed to escape from the abdomen and laparoscopic ports removed.    The nick catheter was removed from the bladder and the balloon removed from the vagina.  On cystoscopy there was brisk efflux noted from both ureters and inspection of the entire bladder was normal, with no evidence of injury or suture within the bladder.     We then turned to closing the abdominal incisions.  Fascia at the umbilical incision was closed with 0-vicryl.  Subcuticular stitch using 4-0 monocryl  was used to close the skin edges at the port sites and the umbilical site.  There was  concern for a broken needle tip at the umbilical site and an x-ray was performed which did not reveal any evidence of a retained foreign body. All surgical skin incisions were closed with Dermabond..  The patient was taken to the recovery room in stable condition.     Meena Miranda MD

## 2024-01-16 DIAGNOSIS — Z90.710 STATUS POST LAPAROSCOPIC HYSTERECTOMY: Primary | ICD-10-CM

## 2024-01-16 RX ORDER — OXYCODONE HYDROCHLORIDE 5 MG/1
5 TABLET ORAL EVERY 6 HOURS PRN
Qty: 6 TABLET | Refills: 0 | Status: SHIPPED | OUTPATIENT
Start: 2024-01-16 | End: 2024-01-19

## 2024-01-17 LAB
PATH REPORT.COMMENTS IMP SPEC: NORMAL
PATH REPORT.COMMENTS IMP SPEC: NORMAL
PATH REPORT.FINAL DX SPEC: NORMAL
PATH REPORT.GROSS SPEC: NORMAL
PATH REPORT.MICROSCOPIC SPEC OTHER STN: NORMAL
PATH REPORT.RELEVANT HX SPEC: NORMAL
PHOTO IMAGE: NORMAL

## 2024-02-05 ENCOUNTER — TELEPHONE (OUTPATIENT)
Dept: OBGYN | Facility: CLINIC | Age: 44
End: 2024-02-05
Payer: COMMERCIAL

## 2024-02-08 ENCOUNTER — TELEPHONE (OUTPATIENT)
Dept: OBGYN | Facility: CLINIC | Age: 44
End: 2024-02-08
Payer: COMMERCIAL

## 2024-02-08 ENCOUNTER — OFFICE VISIT (OUTPATIENT)
Dept: OBGYN | Facility: CLINIC | Age: 44
End: 2024-02-08
Attending: OBSTETRICS & GYNECOLOGY
Payer: COMMERCIAL

## 2024-02-08 VITALS
DIASTOLIC BLOOD PRESSURE: 69 MMHG | HEART RATE: 88 BPM | SYSTOLIC BLOOD PRESSURE: 108 MMHG | BODY MASS INDEX: 20.25 KG/M2 | WEIGHT: 129 LBS | HEIGHT: 67 IN

## 2024-02-08 DIAGNOSIS — N60.12 FIBROCYSTIC CHANGES OF LEFT BREAST: ICD-10-CM

## 2024-02-08 DIAGNOSIS — Z90.710 STATUS POST LAPAROSCOPIC HYSTERECTOMY: Primary | ICD-10-CM

## 2024-02-08 LAB
ALBUMIN UR-MCNC: NEGATIVE MG/DL
APPEARANCE UR: CLEAR
BILIRUB UR QL STRIP: NEGATIVE
COLOR UR AUTO: YELLOW
GLUCOSE UR STRIP-MCNC: NEGATIVE MG/DL
HGB UR QL STRIP: NEGATIVE
KETONES UR STRIP-MCNC: NEGATIVE MG/DL
LEUKOCYTE ESTERASE UR QL STRIP: NEGATIVE
NITRATE UR QL: NEGATIVE
PH UR STRIP: 6 [PH] (ref 5–8)
SP GR UR STRIP: 1.02 (ref 1–1.03)
UROBILINOGEN UR STRIP-ACNC: 0.2 E.U./DL

## 2024-02-08 PROCEDURE — 81003 URINALYSIS AUTO W/O SCOPE: CPT | Performed by: OBSTETRICS & GYNECOLOGY

## 2024-02-08 PROCEDURE — 99213 OFFICE O/P EST LOW 20 MIN: CPT | Performed by: OBSTETRICS & GYNECOLOGY

## 2024-02-08 PROCEDURE — 99024 POSTOP FOLLOW-UP VISIT: CPT | Performed by: OBSTETRICS & GYNECOLOGY

## 2024-02-08 ASSESSMENT — PAIN SCALES - GENERAL: PAINLEVEL: MILD PAIN (2)

## 2024-02-08 NOTE — LETTER
"2/8/2024       RE: Sakina Bowling  [address]  [facility] [address]     Dear Colleague,    Thank you for referring your patient, Sakina Bowling, to the [facility] [place] at [facility]. Please see a copy of my visit note below.    Sakina Bowling is 44 year old yo 4 weeks s/p robot assisted total laparoscopic hysterectomy on 1/12/2024 for fibroid uterus and AUB.  She has been doing overall well. She continues to feel bladder spasms, a pressure when she voids. Denies burning with urination.  She is still voiding 1-2 times a night (improved from 4-5). She has not had bleeding since.  She no longer requires pain medication, but still feels pain, especially on the right side .  Bowel function has returned to normal.  She asks about the liver appearance at time of surgery and what follow-up she should have.    She has noted a lumpy area in the left breast near the axilla.  She has a history of bilateral breast tenderness with her menstrual cycle, but this seems different.     ROS: 10 point ROS neg other than the symptoms noted above in the HPI.    Past Medical History:   Diagnosis Date    NO ACTIVE PROBLEMS        Past Surgical History:   Procedure Laterality Date    C/SECTION, LOW TRANSVERSE  01/16/2014    DAVINCI HYSTERECTOMY TOTAL, SALPINGECTOMY BILATERAL Bilateral 1/12/2024    Procedure: HYSTERECTOMY, TOTAL, ROBOT-ASSISTED, WITH BILATERAL SALPINGECTOMY, CYSTOSCOPY;  Surgeon: Meena Miranda MD;  [facility]: [facility]    ESOPHAGOSCOPY, GASTROSCOPY, DUODENOSCOPY (EGD), COMBINED N/A 10/12/2023    Procedure: ESOPHAGOGASTRODUODENOSCOPY, WITH BIOPSY;  Surgeon: John Branham MD;  Location:  GI       /69   Pulse 88   Ht 1.702 m (5' 7.01\")   Wt 58.5 kg (129 lb)   LMP 01/04/2024 (Approximate)   BMI 20.20 kg/m     Gen'l: well appearing  Breast: no skin changes, no dominant mass in either mass, both are dense.  There is an area 10 cm outside of " the nipple which feels fibrocystic, no other areas with this texture.  Abdomen: soft, NT, ND, small laparoscopic incisions are well healed, the larger umbilical incision is healing, small opening where a small cyst abscess was, no drainage   Vulva: normal, no lesions  Urethra: normal  Vagina: pink,  cuff palpates intact, suture line palpable.  Cervix/Uterus: absent  Adnexa: no palpable masses    UA: negative  Pathology:  Uterus, cervix, bilateral fallopian tubes, robot assisted total laparoscopic hysterectomy and bilateral salpingectomy:  -Midsecretory endometrium  -Multiple leiomyomas with degenerative changes  -Cervix with nabothian cyst  -Unremarkable bilateral fallopian tubes   -Negative for malignancy    Assessment/Plan:  4 weeks s/p Robot assisted total laparoscopic hysterectomy, bilateral salpingectomy doing well.  Persistent urinary spasms/irritation  Localized fibrocystic change in left breast    - Reviewed benign pathology  - Recommend trial of pyridium, discussed relaxing pelvic floor when voiding, avoid valsalva. Consider pelvic floor PT if not improving over the next 4 weeks. Reassured no evidence of infection or blood on UA today.  - OK to return to work next week. Continue pelvic rest and lifting restrictions for 8 wks.  - Diagnostic exam given localized finding in left breast  - Reviewed photos of liver, recommend LFTs and follow-up with IM, patient has no risk factors for liver disease.    - Return to clinic for annual exam, sooner if any concerns.  Meena Miranda MD

## 2024-02-08 NOTE — PROGRESS NOTES
"Sakina Bowling is 44 year old yo 4 weeks s/p robot assisted total laparoscopic hysterectomy on 1/12/2024 for fibroid uterus and AUB.  She has been doing overall well. She continues to feel bladder spasms, a pressure when she voids. Denies burning with urination.  She is still voiding 1-2 times a night (improved from 4-5). She has not had bleeding since.  She no longer requires pain medication, but still feels pain, especially on the right side .  Bowel function has returned to normal.  She asks about the liver appearance at time of surgery and what follow-up she should have.    She has noted a lumpy area in the left breast near the axilla.  She has a history of bilateral breast tenderness with her menstrual cycle, but this seems different.     ROS: 10 point ROS neg other than the symptoms noted above in the HPI.    Past Medical History:   Diagnosis Date    NO ACTIVE PROBLEMS        Past Surgical History:   Procedure Laterality Date    C/SECTION, LOW TRANSVERSE  01/16/2014    DAVINCI HYSTERECTOMY TOTAL, SALPINGECTOMY BILATERAL Bilateral 1/12/2024    Procedure: HYSTERECTOMY, TOTAL, ROBOT-ASSISTED, WITH BILATERAL SALPINGECTOMY, CYSTOSCOPY;  Surgeon: Meena Miranda MD;  [facility]    ESOPHAGOSCOPY, GASTROSCOPY, DUODENOSCOPY (EGD), COMBINED N/A 10/12/2023    Procedure: ESOPHAGOGASTRODUODENOSCOPY, WITH BIOPSY;  Surgeon: John Branham MD;  Location:  GI       /69   Pulse 88   Ht 1.702 m (5' 7.01\")   Wt 58.5 kg (129 lb)   LMP 01/04/2024 (Approximate)   BMI 20.20 kg/m     Gen'l: well appearing  Breast: no skin changes, no dominant mass in either mass, both are dense.  There is an area 10 cm outside of the nipple which feels fibrocystic, no other areas with this texture.  Abdomen: soft, NT, ND, small laparoscopic incisions are well healed, the larger umbilical incision is healing, small opening where a small cyst abscess was, no drainage   Vulva: normal, no lesions  Urethra: normal  Vagina: pink,  cuff " palpates intact, suture line palpable.  Cervix/Uterus: absent  Adnexa: no palpable masses    UA: negative  Pathology:  Uterus, cervix, bilateral fallopian tubes, robot assisted total laparoscopic hysterectomy and bilateral salpingectomy:  -Midsecretory endometrium  -Multiple leiomyomas with degenerative changes  -Cervix with nabothian cyst  -Unremarkable bilateral fallopian tubes   -Negative for malignancy    Assessment/Plan:  4 weeks s/p Robot assisted total laparoscopic hysterectomy, bilateral salpingectomy doing well.  Persistent urinary spasms/irritation  Localized fibrocystic change in left breast    - Reviewed benign pathology  - Recommend trial of pyridium, discussed relaxing pelvic floor when voiding, avoid valsalva. Consider pelvic floor PT if not improving over the next 4 weeks. Reassured no evidence of infection or blood on UA today.  - OK to return to work next week. Continue pelvic rest and lifting restrictions for 8 wks.  - Diagnostic exam given localized finding in left breast  - Reviewed photos of liver, recommend LFTs and follow-up with IM, patient has no risk factors for liver disease.    - Return to clinic for annual exam, sooner if any concerns.  Meena Miranda MD

## 2024-02-13 ENCOUNTER — TELEPHONE (OUTPATIENT)
Dept: OBGYN | Facility: CLINIC | Age: 44
End: 2024-02-13
Payer: COMMERCIAL

## 2024-02-13 NOTE — TELEPHONE ENCOUNTER
[facility] Call Center    Phone Message    May a detailed message be left on voicemail: yes     Reason for Call: Other: [employer] would like a call back to confirm information. Please call to discuss.     Action Taken: Other: [facility]    Travel Screening: Not Applicable

## 2024-02-22 ENCOUNTER — TELEPHONE (OUTPATIENT)
Dept: OBGYN | Facility: CLINIC | Age: 44
End: 2024-02-22
Payer: COMMERCIAL

## 2024-02-22 NOTE — TELEPHONE ENCOUNTER
[facility] short term disability claim form completed, signed by Dr. Miranda and faxed to [phone].

## 2024-03-14 DIAGNOSIS — D64.9 ANEMIA, UNSPECIFIED TYPE: Primary | ICD-10-CM

## 2024-03-14 DIAGNOSIS — R10.84 ABDOMINAL PAIN, GENERALIZED: Primary | ICD-10-CM

## 2024-03-21 ENCOUNTER — LAB (OUTPATIENT)
Dept: LAB | Facility: CLINIC | Age: 44
End: 2024-03-21
Payer: COMMERCIAL

## 2024-03-21 DIAGNOSIS — D64.9 ANEMIA, UNSPECIFIED TYPE: ICD-10-CM

## 2024-03-21 DIAGNOSIS — Z90.710 STATUS POST LAPAROSCOPIC HYSTERECTOMY: ICD-10-CM

## 2024-03-21 LAB
ALBUMIN SERPL BCG-MCNC: 4.6 G/DL (ref 3.5–5.2)
ALP SERPL-CCNC: 70 U/L (ref 40–150)
ALT SERPL W P-5'-P-CCNC: 13 U/L (ref 0–50)
ANION GAP SERPL CALCULATED.3IONS-SCNC: 10 MMOL/L (ref 7–15)
AST SERPL W P-5'-P-CCNC: 19 U/L (ref 0–45)
BASOPHILS # BLD AUTO: 0.1 10E3/UL (ref 0–0.2)
BASOPHILS NFR BLD AUTO: 1 %
BILIRUB SERPL-MCNC: 0.5 MG/DL
BUN SERPL-MCNC: 8.4 MG/DL (ref 6–20)
CALCIUM SERPL-MCNC: 11.3 MG/DL (ref 8.6–10)
CHLORIDE SERPL-SCNC: 103 MMOL/L (ref 98–107)
CREAT SERPL-MCNC: 0.7 MG/DL (ref 0.51–0.95)
DEPRECATED HCO3 PLAS-SCNC: 26 MMOL/L (ref 22–29)
EGFRCR SERPLBLD CKD-EPI 2021: >90 ML/MIN/1.73M2
EOSINOPHIL # BLD AUTO: 0.2 10E3/UL (ref 0–0.7)
EOSINOPHIL NFR BLD AUTO: 3 %
ERYTHROCYTE [DISTWIDTH] IN BLOOD BY AUTOMATED COUNT: 13.9 % (ref 10–15)
FERRITIN SERPL-MCNC: 42 NG/ML (ref 6–175)
FOLATE SERPL-MCNC: 13.1 NG/ML (ref 4.6–34.8)
GLUCOSE SERPL-MCNC: 64 MG/DL (ref 70–99)
HCT VFR BLD AUTO: 40.5 % (ref 35–47)
HGB BLD-MCNC: 13.2 G/DL (ref 11.7–15.7)
IMM GRANULOCYTES # BLD: 0 10E3/UL
IMM GRANULOCYTES NFR BLD: 0 %
IRON BINDING CAPACITY (ROCHE): 362 UG/DL (ref 240–430)
IRON SATN MFR SERPL: 38 % (ref 15–46)
IRON SERPL-MCNC: 138 UG/DL (ref 37–145)
LYMPHOCYTES # BLD AUTO: 2.2 10E3/UL (ref 0.8–5.3)
LYMPHOCYTES NFR BLD AUTO: 37 %
MCH RBC QN AUTO: 27.8 PG (ref 26.5–33)
MCHC RBC AUTO-ENTMCNC: 32.6 G/DL (ref 31.5–36.5)
MCV RBC AUTO: 85 FL (ref 78–100)
MONOCYTES # BLD AUTO: 0.5 10E3/UL (ref 0–1.3)
MONOCYTES NFR BLD AUTO: 8 %
NEUTROPHILS # BLD AUTO: 3.1 10E3/UL (ref 1.6–8.3)
NEUTROPHILS NFR BLD AUTO: 51 %
NRBC # BLD AUTO: 0 10E3/UL
NRBC BLD AUTO-RTO: 0 /100
PLATELET # BLD AUTO: 281 10E3/UL (ref 150–450)
POTASSIUM SERPL-SCNC: 4 MMOL/L (ref 3.4–5.3)
PROT SERPL-MCNC: 7.9 G/DL (ref 6.4–8.3)
RBC # BLD AUTO: 4.75 10E6/UL (ref 3.8–5.2)
SODIUM SERPL-SCNC: 139 MMOL/L (ref 135–145)
VIT B12 SERPL-MCNC: 385 PG/ML (ref 232–1245)
WBC # BLD AUTO: 6 10E3/UL (ref 4–11)

## 2024-03-21 PROCEDURE — 99000 SPECIMEN HANDLING OFFICE-LAB: CPT | Performed by: PATHOLOGY

## 2024-03-21 PROCEDURE — 82746 ASSAY OF FOLIC ACID SERUM: CPT | Performed by: INTERNAL MEDICINE

## 2024-03-21 PROCEDURE — 82728 ASSAY OF FERRITIN: CPT | Performed by: PATHOLOGY

## 2024-03-21 PROCEDURE — 83550 IRON BINDING TEST: CPT | Performed by: PATHOLOGY

## 2024-03-21 PROCEDURE — 36415 COLL VENOUS BLD VENIPUNCTURE: CPT | Performed by: PATHOLOGY

## 2024-03-21 PROCEDURE — 85025 COMPLETE CBC W/AUTO DIFF WBC: CPT | Performed by: PATHOLOGY

## 2024-03-21 PROCEDURE — 82607 VITAMIN B-12: CPT | Performed by: INTERNAL MEDICINE

## 2024-03-21 PROCEDURE — 83540 ASSAY OF IRON: CPT | Performed by: PATHOLOGY

## 2024-03-21 PROCEDURE — 80053 COMPREHEN METABOLIC PANEL: CPT | Performed by: PATHOLOGY

## 2024-05-20 DIAGNOSIS — J01.00 SUBACUTE MAXILLARY SINUSITIS: Primary | ICD-10-CM

## 2024-07-13 ENCOUNTER — HEALTH MAINTENANCE LETTER (OUTPATIENT)
Age: 44
End: 2024-07-13

## 2024-08-01 DIAGNOSIS — E83.52 CALCIUM BLOOD INCREASED: Primary | ICD-10-CM

## 2025-05-20 ENCOUNTER — TELEPHONE (OUTPATIENT)
Dept: NEUROLOGY | Facility: CLINIC | Age: 45
End: 2025-05-20
Payer: COMMERCIAL

## 2025-05-20 DIAGNOSIS — M47.22 CERVICAL SPONDYLOSIS WITH RADICULOPATHY: Primary | ICD-10-CM

## 2025-05-20 NOTE — TELEPHONE ENCOUNTER
Left Voicemail (1st Attempt) for the patient to call back and schedule the following:    Location:  Neurosurgery  Provider: Dr Gatica  Appointment type: Return Adult Neurosurgery  Appointment mode In Clinic  Return date: First available after imaging has been completed    Specialty phone number: [phone]    Is Imaging Needed: Y  Imaging Phone Number to provide to patient: [phone]    Additional Notes: Patient will need cervical x-rays, and Cervical MRI done prior to appointment with Dr. Gatica. Ideally schedule in the next 2 weeks.

## 2025-05-20 NOTE — TELEPHONE ENCOUNTER
Spoke to Dr. Bowling today on the phone.  She has been dealing with worsening neck pain and has now developed what sounds like left C8 radicular symptoms with numbness into her little finger.  She has previously tried physical therapy and yoga without significant relief.  Given her worsening symptoms with new numbness despite conservative measures I have recommended an MRI of the cervical spine as well as cervical 4 view x-rays and a clinic visit with me.

## 2025-05-28 ENCOUNTER — HOSPITAL ENCOUNTER (OUTPATIENT)
Dept: GENERAL RADIOLOGY | Facility: CLINIC | Age: 45
Discharge: HOME | End: 2025-05-28
Attending: NEUROLOGICAL SURGERY
Payer: COMMERCIAL

## 2025-05-28 DIAGNOSIS — M47.22 CERVICAL SPONDYLOSIS WITH RADICULOPATHY: ICD-10-CM

## 2025-05-28 PROCEDURE — 72050 X-RAY EXAM NECK SPINE 4/5VWS: CPT

## 2025-05-28 PROCEDURE — 72050 X-RAY EXAM NECK SPINE 4/5VWS: CPT | Mod: 26 | Performed by: RADIOLOGY

## 2025-06-06 ENCOUNTER — OFFICE VISIT (OUTPATIENT)
Dept: NEUROSURGERY | Facility: CLINIC | Age: 45
End: 2025-06-06
Payer: COMMERCIAL

## 2025-06-06 VITALS
SYSTOLIC BLOOD PRESSURE: 115 MMHG | HEIGHT: 67 IN | OXYGEN SATURATION: 96 % | BODY MASS INDEX: 21.5 KG/M2 | HEART RATE: 64 BPM | DIASTOLIC BLOOD PRESSURE: 74 MMHG | WEIGHT: 137 LBS

## 2025-06-06 DIAGNOSIS — G56.23 ULNAR NEUROPATHY OF BOTH UPPER EXTREMITIES: Primary | ICD-10-CM

## 2025-06-06 DIAGNOSIS — M47.22 CERVICAL SPONDYLOSIS WITH RADICULOPATHY: ICD-10-CM

## 2025-06-06 PROCEDURE — 99203 OFFICE O/P NEW LOW 30 MIN: CPT | Performed by: NEUROLOGICAL SURGERY

## 2025-06-06 PROCEDURE — 3078F DIAST BP <80 MM HG: CPT | Performed by: NEUROLOGICAL SURGERY

## 2025-06-06 PROCEDURE — 1125F AMNT PAIN NOTED PAIN PRSNT: CPT | Performed by: NEUROLOGICAL SURGERY

## 2025-06-06 PROCEDURE — 3074F SYST BP LT 130 MM HG: CPT | Performed by: NEUROLOGICAL SURGERY

## 2025-06-06 ASSESSMENT — PAIN SCALES - GENERAL: PAINLEVEL_OUTOF10: MILD PAIN (3)

## 2025-06-06 NOTE — LETTER
6/6/2025      Sakina V Dash  [address]  [facility] [address]      Dear Colleague,    Thank you for referring your patient, Sakina Bowling, to the [facility]. Please see a copy of my visit note below.    It was a pleasure to see DrFrankie Presley ROBERT Dash today in Neurosurgery Clinic. She is a 45 year old female who is here for evaluation of her neck pain and upper extremity symptoms.  She has had several years of pain in the lower posterior neck that did not have a clear inciting incident.  However this has been more consistent over the last 6 months.  And seems to affect her sleep.  She describes tightness during the days as well.  She has upper extremity symptoms including mainly numbness on the left side on the ulnar aspect of her forearm with some periscapular pain it is associated with this.  She occasionally gets this on the right.  She denies any significant weakness.  She denies any symptoms consistent with myelopathy.    She also finds that raising her arms hurts.    She has done a few sessions of physical therapy and consistently and is also going to yoga 3 times a week.  She takes occasional ibuprofen for her symptoms.      Past Medical History:   Diagnosis Date     NO ACTIVE PROBLEMS      Past Medical History reviewed with patient during visit.  Past Surgical History:   Procedure Laterality Date     C/SECTION, LOW TRANSVERSE  01/16/2014     DAVINCI HYSTERECTOMY TOTAL, SALPINGECTOMY BILATERAL Bilateral 1/12/2024    Procedure: HYSTERECTOMY, TOTAL, ROBOT-ASSISTED, WITH BILATERAL SALPINGECTOMY, CYSTOSCOPY;  Surgeon: Meena Miranda MD;  [facility]     ESOPHAGOSCOPY, GASTROSCOPY, DUODENOSCOPY (EGD), COMBINED N/A 10/12/2023    Procedure: ESOPHAGOGASTRODUODENOSCOPY, WITH BIOPSY;  Surgeon: John Branham MD;  Location:  GI     Past Surgical History reviewed with patient during visit.  Allergies   Allergen Reactions     Bactrim [Sulfamethoxazole-Trimethoprim] Rash     No  current outpatient medications on file.  Social History     Socioeconomic History     Marital status:      Spouse name: None     Number of children: None     Years of education: None     Highest education level: None   Tobacco Use     Smoking status: Never     Smokeless tobacco: Never   Substance and Sexual Activity     Alcohol use: Yes     Comment: 1 every two months     Drug use: No     Sexual activity: Yes     Partners: Male   Social History Narrative    How much exercise per week? Walking daily    How much calcium per day? In dairy       How much caffeine per day? 2 cups  tea    How much vitamin D per day? Sun light and foods    Do you/your family wear seatbelts?  Yes    Do you/your family use safety helmets? Yes    Do you/your family use sunscreen? Yes    Do you/your family keep firearms in the home? No    Do you/your family have a smoke detector(s)? Yes        Reviewed cmckim lpn 6-                  Social Drivers of Health     Financial Resource Strain: Low Risk  (12/4/2023)    Financial Resource Strain      Within the past 12 months, have you or your family members you live with been unable to get utilities (heat, electricity) when it was really needed?: No   Food Insecurity: Low Risk  (12/4/2023)    Food Insecurity      Within the past 12 months, did you worry that your food would run out before you got money to buy more?: No      Within the past 12 months, did the food you bought just not last and you didn t have money to get more?: No   Transportation Needs: Low Risk  (12/4/2023)    Transportation Needs      Within the past 12 months, has lack of transportation kept you from medical appointments, getting your medicines, non-medical meetings or appointments, work, or from getting things that you need?: No   Interpersonal Safety: Low Risk  (1/4/2024)    Interpersonal Safety      Do you feel physically and emotionally safe where you currently live?: Yes      Within the past 12 months, have you  "been hit, slapped, kicked or otherwise physically hurt by someone?: No      Within the past 12 months, have you been humiliated or emotionally abused in other ways by your partner or ex-partner?: No   Housing Stability: Low Risk  (12/4/2023)    Housing Stability      Do you have housing? : Yes      Are you worried about losing your housing?: No     Family History   Problem Relation Age of Onset     Diabetes Mother      Hypertension Mother      Muscular Dystrophy Mother         ROS: 10 point ROS neg other than the symptoms noted above in the HPI.    Vitals:    06/06/25 0758   BP: 115/74   Pulse: 64   SpO2: 96%   Weight: 62.1 kg (137 lb)   Height: 1.702 m (5' 7\")     Estimated body mass index is 21.46 kg/m  as calculated from the following:    Height as of this encounter: 1.702 m (5' 7\").    Weight as of this encounter: 62.1 kg (137 lb).  Mild Pain (3)    Neck Disability Index       No data to display                Visual Analog Scale (VAS) Questionnaire         No data to display                   Awake and alert.    Bilateral upper extremity strength is 5 out of 5 in all muscle groups  Reflexes symmetric and normal  Sensation intact to pinprick in the bilateral upper extremities.  Positive Tinel's sign at the left elbow.  Negative Phalen sign bilaterally  Negative Spurling sign bilaterally    Imaging: Review of her cervical x-rays including flexion-extension views and her cervical MRI shows good alignment of the cervical spine without evidence of instability.  Her MRI shows some central disc bulges particularly at C5-6 without significant foraminal impingement.  There is an incidentally noted persistent central canal at the T1 level.  The imaging was reviewed with the patient shown to the patient clinic today.    Assessment: Cervical spondylosis with possible radiculopathy.  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    Plan: I would like to obtain EMG nerve conduction study of the upper extremities to see we can better " understand her upper extremity symptoms.  At this point however I do not think that surgical intervention is likely to be beneficial for many of her neck symptoms.  I have encouraged her to continue physical therapy and we will provide her a formal referral for physical therapy.  I would also like her to see my colleague Dr. Jurado with  physiatry to see if there are other nonsurgical interventions for her neck issues that might be beneficial.  We will review the EMG results once this is done and we will follow-up as needed.       Again, thank you for allowing me to participate in the care of your patient.        Sincerely,        Leo Gatica MD    Electronically signed

## 2025-06-06 NOTE — NURSING NOTE
"Sakina Bowling is a 45 year old female who presents for:  Chief Complaint   Patient presents with    RECHECK     Cervical spondylosis with radiculopathy. Patient reports pain in neck about a level 3, with pain and numbness/tingling/burning sensations in shoulders and arms bilaterally.        Initial Vitals:  /74   Pulse 64   Ht 5' 7\" (1.702 m)   Wt 137 lb (62.1 kg)   LMP 01/04/2024 (Approximate)   SpO2 96%   BMI 21.46 kg/m   Estimated body mass index is 21.46 kg/m  as calculated from the following:    Height as of this encounter: 5' 7\" (1.702 m).    Weight as of this encounter: 137 lb (62.1 kg). Body surface area is 1.71 meters squared. BP completed using cuff size: regular  Mild Pain (3)        Jareth Figueroa    "

## 2025-06-06 NOTE — PATIENT INSTRUCTIONS
Patient Next Steps:      Order placed for physical therapy. You can call the phone number highlighted in the order to schedule your appointment. Please call our clinic if symptoms persist after your course of physical therapy.  If you have not heard from the scheduling office within 2 business days, please call [phone] for ANNIA Noonan, [phone] for Paula and [phone] for [place].      Order placed for EMG. You can call the phone number highlighted in the order to schedule your appointment. We will call you with the results once EMG is completed.  [facility] will call you to coordinate your care as prescribed by your provider. If you don't hear from a representative within 2 business days, please call [phone].  If you would like to inquire on a sooner Neurology appointment, I will list a few external options and their phone numbers. You can call and ask them how far out they are booking.  Please let us know if you wish to go to one of these external options and we can fax them the order.   [place]: [phone]  [facility]: [phone]  [place]: [phone]       A referral has been placed for you to see Dr. Jurado with Pain Management.     Please call us if you have any further questions or concerns.    [facility]   Phone: [phone]  Fax: [phone]

## 2025-06-06 NOTE — PROGRESS NOTES
It was a pleasure to see Dr. Sakina Bowling today in Neurosurgery Clinic. She is a 45 year old female who is here for evaluation of her neck pain and upper extremity symptoms.  She has had several years of pain in the lower posterior neck that did not have a clear inciting incident.  However this has been more consistent over the last 6 months.  And seems to affect her sleep.  She describes tightness during the days as well.  She has upper extremity symptoms including mainly numbness on the left side on the ulnar aspect of her forearm with some periscapular pain it is associated with this.  She occasionally gets this on the right.  She denies any significant weakness.  She denies any symptoms consistent with myelopathy.    She also finds that raising her arms hurts.    She has done a few sessions of physical therapy and consistently and is also going to yoga 3 times a week.  She takes occasional ibuprofen for her symptoms.      Past Medical History:   Diagnosis Date    NO ACTIVE PROBLEMS      Past Medical History reviewed with patient during visit.  Past Surgical History:   Procedure Laterality Date    C/SECTION, LOW TRANSVERSE  01/16/2014    DAVINCI HYSTERECTOMY TOTAL, SALPINGECTOMY BILATERAL Bilateral 1/12/2024    Procedure: HYSTERECTOMY, TOTAL, ROBOT-ASSISTED, WITH BILATERAL SALPINGECTOMY, CYSTOSCOPY;  Surgeon: Meena Miranad MD;  Location: [facility] OR    ESOPHAGOSCOPY, GASTROSCOPY, DUODENOSCOPY (EGD), COMBINED N/A 10/12/2023    Procedure: ESOPHAGOGASTRODUODENOSCOPY, WITH BIOPSY;  Surgeon: John Branham MD;  Location:  GI     Past Surgical History reviewed with patient during visit.  Allergies   Allergen Reactions    Bactrim [Sulfamethoxazole-Trimethoprim] Rash     No current outpatient medications on file.  Social History     Socioeconomic History    Marital status:      Spouse name: None    Number of children: None    Years of education: None    Highest education level: None   Tobacco Use    Smoking  status: Never    Smokeless tobacco: Never   Substance and Sexual Activity    Alcohol use: Yes     Comment: 1 every two months    Drug use: No    Sexual activity: Yes     Partners: Male   Social History Narrative    How much exercise per week? Walking daily    How much calcium per day? In dairy       How much caffeine per day? 2 cups  tea    How much vitamin D per day? Sun light and foods    Do you/your family wear seatbelts?  Yes    Do you/your family use safety helmets? Yes    Do you/your family use sunscreen? Yes    Do you/your family keep firearms in the home? No    Do you/your family have a smoke detector(s)? Yes        [facility]                  Social Drivers of Health     Financial Resource Strain: Low Risk  (12/4/2023)    Financial Resource Strain     Within the past 12 months, have you or your family members you live with been unable to get utilities (heat, electricity) when it was really needed?: No   Food Insecurity: Low Risk  (12/4/2023)    Food Insecurity     Within the past 12 months, did you worry that your food would run out before you got money to buy more?: No     Within the past 12 months, did the food you bought just not last and you didn t have money to get more?: No   Transportation Needs: Low Risk  (12/4/2023)    Transportation Needs     Within the past 12 months, has lack of transportation kept you from medical appointments, getting your medicines, non-medical meetings or appointments, work, or from getting things that you need?: No   Interpersonal Safety: Low Risk  (1/4/2024)    Interpersonal Safety     Do you feel physically and emotionally safe where you currently live?: Yes     Within the past 12 months, have you been hit, slapped, kicked or otherwise physically hurt by someone?: No     Within the past 12 months, have you been humiliated or emotionally abused in other ways by your partner or ex-partner?: No   Housing Stability: Low Risk  (12/4/2023)    Housing  "Stability     Do you have housing? : Yes     Are you worried about losing your housing?: No     Family History   Problem Relation Age of Onset    Diabetes Mother     Hypertension Mother     Muscular Dystrophy Mother         ROS: 10 point ROS neg other than the symptoms noted above in the HPI.    Vitals:    06/06/25 0758   BP: 115/74   Pulse: 64   SpO2: 96%   Weight: 62.1 kg (137 lb)   Height: 1.702 m (5' 7\")     Estimated body mass index is 21.46 kg/m  as calculated from the following:    Height as of this encounter: 1.702 m (5' 7\").    Weight as of this encounter: 62.1 kg (137 lb).  Mild Pain (3)    Neck Disability Index       No data to display                Visual Analog Scale (VAS) Questionnaire         No data to display                   Awake and alert.    Bilateral upper extremity strength is 5 out of 5 in all muscle groups  Reflexes symmetric and normal  Sensation intact to pinprick in the bilateral upper extremities.  Positive Tinel's sign at the left elbow.  Negative Phalen sign bilaterally  Negative Spurling sign bilaterally    Imaging: Review of her cervical x-rays including flexion-extension views and her cervical MRI shows good alignment of the cervical spine without evidence of instability.  Her MRI shows some central disc bulges particularly at C5-6 without significant foraminal impingement.  There is an incidentally noted persistent central canal at the T1 level.  The imaging was reviewed with the patient shown to the patient clinic today.    Assessment: Cervical spondylosis with possible radiculopathy.  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    Plan: I would like to obtain EMG nerve conduction study of the upper extremities to see we can better understand her upper extremity symptoms.  At this point however I do not think that surgical intervention is likely to be beneficial for many of her neck symptoms.  I have encouraged her to continue physical therapy and we will provide her a formal referral " for physical therapy.  I would also like her to see my colleague Dr. Jurado with  physiatry to see if there are other nonsurgical interventions for her neck issues that might be beneficial.  We will review the EMG results once this is done and we will follow-up as needed.

## 2025-06-09 ENCOUNTER — PATIENT OUTREACH (OUTPATIENT)
Dept: CARE COORDINATION | Facility: CLINIC | Age: 45
End: 2025-06-09
Payer: COMMERCIAL

## 2025-06-11 ENCOUNTER — PATIENT OUTREACH (OUTPATIENT)
Dept: CARE COORDINATION | Facility: CLINIC | Age: 45
End: 2025-06-11
Payer: COMMERCIAL

## 2025-07-02 ENCOUNTER — OFFICE VISIT (OUTPATIENT)
Dept: NEUROLOGY | Facility: CLINIC | Age: 45
End: 2025-07-02
Payer: COMMERCIAL

## 2025-07-02 DIAGNOSIS — G56.23 ULNAR NEUROPATHY OF BOTH UPPER EXTREMITIES: Primary | ICD-10-CM

## 2025-07-02 NOTE — LETTER
2025       RE: Sakina Bowling  [id number]  [place] [address]     Dear Colleague,    Thank you for referring your patient, Sakina Bowling, to the [facility] [place] at [facility]. Please see a copy of my visit note below.                        [facility]  Electrodiagnostic Laboratory                 Department of Neurology                                                                                                         Test Date:  2025    Patient: Sakina Bowling : 1980 Physician: Neymar Nelson MD   Sex: Female AGE: 45 year Ref Phys: Leo Gatica MD   ID#: [id number]   Technician: Tiara Cain     History and Examination:  45 year old with intermittent numbness in digits 4 and 5 of both hands. She also endorses neck pain. This study is being performed to investigate for radiculopathy vs focal neuropathy.     Techniques:  Motor and sensory conduction studies were done with surface recording electrodes. EMG was done with a concentric needle electrode.     Results:  Nerve conduction studies:  1. Bilateral median-D2 and ulnar-D5 sensory responses are normal.   2. Bilateral ulnar-ADM and ulnar-FDI motor responses show normal DL, normal amplitudes and mild CV slowing across the elbow.   3. Bilateral median-APB motor responses are normal.     Needle EMG of selected proximal and distal right and left upper limb muscles was performed as tabulated below. No abnormal spontaneous activity was observed in the sampled muscles. Motor unit potential morphology and recruitment patterns were normal.     Interpretation:  This is an abnormal study. There is electrophysiologic evidence of mild ulnar neuropathies across the elbows on both sides. Both the right and left ulnar neuropathy pathophysiology appears to be focal demyelination at this point. There is no evidence of loss of axonal integrity on the basis of this study.  There is also no evidence of a cervical radiculopathy affecting the upper limbs. Clinical correlation is recommended.     Neymar Nelson MD  Department of Neurology    Nerve Conduction Studies  Motor Sites      Latency Neg. Amp Neg. Amp Diff Segment Distance Velocity Neg. Dur Neg Area Diff Temperature Comment   Site (ms) Norm (mV) Norm (%)  cm m/s Norm (ms) (%) ( C)    Left Median (APB) Motor   Wrist 3.1  < 4.4 7.4  > 5.0  Wrist-APB 8   5.6  31.3    Elbow 6.8 - 7.3  > 5.0 -1 Elbow-Wrist 20.5 55  > 48 5.9 1 31.9    Right Median (APB) Motor   Wrist 3.1  < 4.4 10.2  > 5.0  Wrist-APB 8   4.7  30.9    Elbow 6.8 - 9.8  > 5.0 -4 Elbow-Wrist 21.5 58  > 48 4.7 -5 31    Left Ulnar (ADM) Motor   Wrist 2.7  < 3.5 9.3  > 5.0  Wrist-ADM 8   6.1  31.7    Below Elbow 6.0 - 8.2 - -12 Below Elbow-Wrist 20.3 62  > 48 6.5 -4 32.1    Above Elbow 8.6 - 7.7 - -6 Above Elbow-Below Elbow 10.1 39  > 48 6.2 -4 32.2    Right Ulnar (ADM) Motor   Wrist 2.7  < 3.5 8.7  > 5.0  Wrist-ADM 8   6.0      Below Elbow 6.3 - 7.2 - -17 Below Elbow-Wrist 21 58  > 48 6.0 -15     Above Elbow 8.6 - 7.3 - 1 Above Elbow-Below Elbow 9 39  > 48 5.8 2     Upper Arm 10.0 - 6.9 - -5 Upper Arm-Above Elbow 9 64 - 5.7 -4     Left Ulnar (FDI) Motor   Wrist 3.7 - 13.2 -      4.5  31.6 14.0 cm   Below Elbow 7.0 - 12.0 - -9 Below Elbow-Wrist 20.5 62  > 48 4.8 -13 31.4    Above Elbow 9.3 - 12.4 - 3 Above Elbow-Below Elbow 10.1 44  > 48 4.7 4 31.4    Right Ulnar (FDI) Motor   Wrist 3.7 - 14.6 -      4.4   13.5 cm   Below Elbow 7.2 - 13.6 - -7 Below Elbow-Wrist 21 60  > 48 4.4 -8     Above Elbow 9.3 - 13.7 - 1 Above Elbow-Below Elbow 9 43  > 48 4.3 1     Upper Arm 10.6 - 13.5 - -1 Upper Arm-Above Elbow 9 69 - 4.4 1     F-Wave Sites      Min F-Lat Max-Min F-Lat Mean F-Lat   Site (ms) (ms) (ms)   Left Median F-Wave   Wrist 26.2 - 26.2   Left Ulnar F-Wave   Wrist 29.0 1.70 29.9     Sensory Sites      Onset Lat Peak Lat Amp (O-P) Amp (P-P) Segment Distance Velocity Temperature  Comment   Site ms (ms)  V Norm ( V)  cm m/s Norm ( C)    Left Median Sensory   Wrist-Dig II 2.3 3.2 71  > 10 98 Wrist-Dig II 14 61  > 48 31.7    Right Median Sensory   Wrist-Dig II 2.3 3.1 51  > 10 71 Wrist-Dig II 14 61  > 48 31.4    Left Ulnar Sensory   Wrist 2.3 3.1 81  > 8 116 Wrist-Dig V 12.5 54  > 48 31.8    Right Ulnar Sensory   Wrist-Dig V 2.1 2.8 64  > 8 132 Wrist-Dig V 12.5 60  > 48 31.7        Electromyography     Side Muscle Ins Act Fibs/PSW Fasc HF Amp Dur Poly Recrt Int Pat   Right Deltoid Nml None Nml 0 Nml Nml 0 Nml Nml   Right Biceps Nml None Nml 0 Nml Nml 0 Nml Nml   Right Triceps Nml None Nml 0 Nml Nml 0 Nml Nml   Right Pronator Teres Nml None Nml 0 Nml Nml 0 Nml Nml   Right FDI Nml None Nml 0 Nml Nml 0 Nml Nml   Left Deltoid Nml None Nml 0 Nml Nml 0 Nml Nml   Left Biceps Nml None Nml 0 Nml Nml 0 Nml Nml   Left Triceps Nml None Nml 0 Nml Nml 0 Nml Nml   Left Pronator Teres Nml None Nml 0 Nml Nml 0 Nml Nml   Left FDI Nml None Nml 0 Nml Nml 0 Nml Nml       NCS Waveforms:    Motor                    Sensory                F-Wave                               Again, thank you for allowing me to participate in the care of your patient.      Sincerely,    Neymar Nelson MD

## 2025-07-02 NOTE — PROGRESS NOTES
[facility]  Electrodiagnostic Laboratory                 Department of Neurology                                                                                                         Test Date:  2025    Patient: Sakina Bowling : 1980 Physician: Neymar Nelson MD   Sex: Female AGE: 45 year Ref Phys: Leo Gatica MD   ID#: [id number]   Technician: Tiara Cain     History and Examination:  45 year old with intermittent numbness in digits 4 and 5 of both hands. She also endorses neck pain. This study is being performed to investigate for radiculopathy vs focal neuropathy.     Techniques:  Motor and sensory conduction studies were done with surface recording electrodes. EMG was done with a concentric needle electrode.     Results:  Nerve conduction studies:  1. Bilateral median-D2 and ulnar-D5 sensory responses are normal.   2. Bilateral ulnar-ADM and ulnar-FDI motor responses show normal DL, normal amplitudes and mild CV slowing across the elbow.   3. Bilateral median-APB motor responses are normal.     Needle EMG of selected proximal and distal right and left upper limb muscles was performed as tabulated below. No abnormal spontaneous activity was observed in the sampled muscles. Motor unit potential morphology and recruitment patterns were normal.     Interpretation:  This is an abnormal study. There is electrophysiologic evidence of mild ulnar neuropathies across the elbows on both sides. Both the right and left ulnar neuropathy pathophysiology appears to be focal demyelination at this point. There is no evidence of loss of axonal integrity on the basis of this study. There is also no evidence of a cervical radiculopathy affecting the upper limbs. Clinical correlation is recommended.     Neymar Nelson MD  Department of Neurology    Nerve Conduction Studies  Motor Sites      Latency Neg. Amp Neg. Amp Diff Segment Distance Velocity Neg. Dur Neg Area Diff Temperature  Comment   Site (ms) Norm (mV) Norm (%)  cm m/s Norm (ms) (%) ( C)    Left Median (APB) Motor   Wrist 3.1  < 4.4 7.4  > 5.0  Wrist-APB 8   5.6  31.3    Elbow 6.8 - 7.3  > 5.0 -1 Elbow-Wrist 20.5 55  > 48 5.9 1 31.9    Right Median (APB) Motor   Wrist 3.1  < 4.4 10.2  > 5.0  Wrist-APB 8   4.7  30.9    Elbow 6.8 - 9.8  > 5.0 -4 Elbow-Wrist 21.5 58  > 48 4.7 -5 31    Left Ulnar (ADM) Motor   Wrist 2.7  < 3.5 9.3  > 5.0  Wrist-ADM 8   6.1  31.7    Below Elbow 6.0 - 8.2 - -12 Below Elbow-Wrist 20.3 62  > 48 6.5 -4 32.1    Above Elbow 8.6 - 7.7 - -6 Above Elbow-Below Elbow 10.1 39  > 48 6.2 -4 32.2    Right Ulnar (ADM) Motor   Wrist 2.7  < 3.5 8.7  > 5.0  Wrist-ADM 8   6.0      Below Elbow 6.3 - 7.2 - -17 Below Elbow-Wrist 21 58  > 48 6.0 -15     Above Elbow 8.6 - 7.3 - 1 Above Elbow-Below Elbow 9 39  > 48 5.8 2     Upper Arm 10.0 - 6.9 - -5 Upper Arm-Above Elbow 9 64 - 5.7 -4     Left Ulnar (FDI) Motor   Wrist 3.7 - 13.2 -      4.5  31.6 14.0 cm   Below Elbow 7.0 - 12.0 - -9 Below Elbow-Wrist 20.5 62  > 48 4.8 -13 31.4    Above Elbow 9.3 - 12.4 - 3 Above Elbow-Below Elbow 10.1 44  > 48 4.7 4 31.4    Right Ulnar (FDI) Motor   Wrist 3.7 - 14.6 -      4.4   13.5 cm   Below Elbow 7.2 - 13.6 - -7 Below Elbow-Wrist 21 60  > 48 4.4 -8     Above Elbow 9.3 - 13.7 - 1 Above Elbow-Below Elbow 9 43  > 48 4.3 1     Upper Arm 10.6 - 13.5 - -1 Upper Arm-Above Elbow 9 69 - 4.4 1     F-Wave Sites      Min F-Lat Max-Min F-Lat Mean F-Lat   Site (ms) (ms) (ms)   Left Median F-Wave   Wrist 26.2 - 26.2   Left Ulnar F-Wave   Wrist 29.0 1.70 29.9     Sensory Sites      Onset Lat Peak Lat Amp (O-P) Amp (P-P) Segment Distance Velocity Temperature Comment   Site ms (ms)  V Norm ( V)  cm m/s Norm ( C)    Left Median Sensory   Wrist-Dig II 2.3 3.2 71  > 10 98 Wrist-Dig II 14 61  > 48 31.7    Right Median Sensory   Wrist-Dig II 2.3 3.1 51  > 10 71 Wrist-Dig II 14 61  > 48 31.4    Left Ulnar Sensory   Wrist 2.3 3.1 81  > 8 116 Wrist-Dig V 12.5 54  > 48  31.8    Right Ulnar Sensory   Wrist-Dig V 2.1 2.8 64  > 8 132 Wrist-Dig V 12.5 60  > 48 31.7        Electromyography     Side Muscle Ins Act Fibs/PSW Fasc HF Amp Dur Poly Recrt Int Pat   Right Deltoid Nml None Nml 0 Nml Nml 0 Nml Nml   Right Biceps Nml None Nml 0 Nml Nml 0 Nml Nml   Right Triceps Nml None Nml 0 Nml Nml 0 Nml Nml   Right Pronator Teres Nml None Nml 0 Nml Nml 0 Nml Nml   Right FDI Nml None Nml 0 Nml Nml 0 Nml Nml   Left Deltoid Nml None Nml 0 Nml Nml 0 Nml Nml   Left Biceps Nml None Nml 0 Nml Nml 0 Nml Nml   Left Triceps Nml None Nml 0 Nml Nml 0 Nml Nml   Left Pronator Teres Nml None Nml 0 Nml Nml 0 Nml Nml   Left FDI Nml None Nml 0 Nml Nml 0 Nml Nml       NCS Waveforms:    Motor                    Sensory                F-Wave

## 2025-07-11 ENCOUNTER — OFFICE VISIT (OUTPATIENT)
Dept: INTERNAL MEDICINE | Facility: CLINIC | Age: 45
End: 2025-07-11
Payer: COMMERCIAL

## 2025-07-11 DIAGNOSIS — Z13.220 SCREENING CHOLESTEROL LEVEL: ICD-10-CM

## 2025-07-11 DIAGNOSIS — Z12.31 ENCOUNTER FOR SCREENING MAMMOGRAM FOR BREAST CANCER: Primary | ICD-10-CM

## 2025-07-11 DIAGNOSIS — R94.6 THYROID FUNCTION TEST ABNORMAL: ICD-10-CM

## 2025-07-11 NOTE — PROGRESS NOTES
HPI;    Past Medical History:   Diagnosis Date    NO ACTIVE PROBLEMS      Past Surgical History:   Procedure Laterality Date    C/SECTION, LOW TRANSVERSE  01/16/2014    DAVINCI HYSTERECTOMY TOTAL, SALPINGECTOMY BILATERAL Bilateral 1/12/2024    Procedure: HYSTERECTOMY, TOTAL, ROBOT-ASSISTED, WITH BILATERAL SALPINGECTOMY, CYSTOSCOPY;  Surgeon: Meena Miranda MD;  Location: [facility] OR    ESOPHAGOSCOPY, GASTROSCOPY, DUODENOSCOPY (EGD), COMBINED N/A 10/12/2023    Procedure: ESOPHAGOGASTRODUODENOSCOPY, WITH BIOPSY;  Surgeon: John Branham MD;  Location:  GI     PE:    Vitals noted, gen, nad, cooperative, alert    MR Cervical Spine w/o Contrast  Narrative: EXAM: MR CERVICAL SPINE W/O CONTRAST  LOCATION: [facility]  DATE: 5/29/2025    INDICATION: Left C8 radicular symptoms.  COMPARISON: None available.  TECHNIQUE: MRI Cervical Spine without IV contrast.    FINDINGS:   Cervical spine alignment is grossly within normal limits. No loss of vertebral body height. No focal destructive bony lesions. 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 No extraspinal abnormality.    Craniovertebral junction and C1-C2: Normal.    C2-C3: Normal disc height. No herniation. Normal facets. No spinal canal or neural foraminal stenosis.     C3-C4: Normal disc height. Shallow posterior disc bulge. Normal facets. No spinal canal or neural foraminal stenosis.     C4-C5: Normal disc height. Central disc protrusion. Normal facets. Mild spinal canal narrowing. No neural foraminal narrowing.    C5-C6: Mild loss of disc height. Central disc protrusion which abuts and indents the ventral spinal cord. Normal facets. Mild spinal canal narrowing. No neural foraminal narrowing.     C6-C7: Mild loss of disc height. Right subarticular and foraminal disc bulge. Normal facets. No spinal canal narrowing. Mild right neural  foraminal narrowing. No left neural foraminal narrowing.     C7-T1: Normal disc height. No herniation. Bilateral facet hypertrophy. No spinal canal narrowing. Mild bilateral neural foraminal narrowing.  Impression: IMPRESSION:  1.  Degenerative changes in the cervical spine as detailed above.  2.  Mild spinal canal narrowings at C5-C6 and C4-C5.  3.  Mild right neural foraminal narrowing at C6-C7.  4.  Mild neural foraminal narrowings at C7-T1 from facet hypertrophy.  5.  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      A/P:    1. Immunizations; Pfizer COVID vaccine x 5. Tdap 2/2/2022  2. See Dr. Miranda, GYN  2/8/2025 follow up hysterectomy 1/12/2024 for bleeding.   3. Lipids; 5/19/2022; , HDL 60, TG's 138. Ordered lipids today.   4. Low iron; labs 5/19/2022; Ferritin 12 (range ), Iron 218, TIBC 429, and iron sat 51% Hgb 13.3, MCV 89 and RDW normal at 14.8%. Ordered labs today 5/18/2023.   5. Mammogram; 5/18/2023   6. Hip pain; she had plain X-rays 2/2/2022. Placed orthopedic referral to Specifically Dr. Kermit zuniga. No current sxs.   7. Atypical chest pain family h/o vascular disease. She had normal stress exercise echo test 8/2/2022.   8. EGD was done 10/12/2023 for reflux.   9. Dermatology; she does not feel she needs to see dermatology for a skin cancer check  10. EMG with Dr. Nelson 7/2/2025; mild ulnar neuropathies. See Dr. Roman's Neurosurgery note from 6/6/2025 regarding neck pain; MRI cervical spine 5/29/2025  11. Colonoscopy         30 minutes spent on the date of the encounter doing chart review, history and exam, documentation and further activities as noted above exclusive of procedures and other billable interpretations

## 2025-07-17 ENCOUNTER — ANCILLARY PROCEDURE (OUTPATIENT)
Dept: MAMMOGRAPHY | Facility: CLINIC | Age: 45
End: 2025-07-17
Attending: INTERNAL MEDICINE
Payer: COMMERCIAL

## 2025-07-17 DIAGNOSIS — Z12.31 ENCOUNTER FOR SCREENING MAMMOGRAM FOR BREAST CANCER: ICD-10-CM

## 2025-07-17 PROCEDURE — 77063 BREAST TOMOSYNTHESIS BI: CPT | Mod: GC | Performed by: STUDENT IN AN ORGANIZED HEALTH CARE EDUCATION/TRAINING PROGRAM

## 2025-07-17 PROCEDURE — 77067 SCR MAMMO BI INCL CAD: CPT | Mod: GC | Performed by: STUDENT IN AN ORGANIZED HEALTH CARE EDUCATION/TRAINING PROGRAM

## 2025-07-19 ENCOUNTER — HEALTH MAINTENANCE LETTER (OUTPATIENT)
Age: 45
End: 2025-07-19

## 2025-07-28 ENCOUNTER — THERAPY VISIT (OUTPATIENT)
Dept: PHYSICAL THERAPY | Facility: CLINIC | Age: 45
End: 2025-07-28
Payer: COMMERCIAL

## 2025-07-28 DIAGNOSIS — M47.22 OSTEOARTHRITIS OF SPINE WITH RADICULOPATHY, CERVICAL REGION: Primary | ICD-10-CM

## 2025-07-28 PROCEDURE — 97161 PT EVAL LOW COMPLEX 20 MIN: CPT | Mod: GP | Performed by: PHYSICAL THERAPIST

## 2025-07-28 PROCEDURE — 97110 THERAPEUTIC EXERCISES: CPT | Mod: GP | Performed by: PHYSICAL THERAPIST

## 2025-07-28 PROCEDURE — 97530 THERAPEUTIC ACTIVITIES: CPT | Mod: GP | Performed by: PHYSICAL THERAPIST

## 2025-07-28 NOTE — PROGRESS NOTES
PHYSICAL THERAPY EVALUATION  Type of Visit: Evaluation       Fall Risk Screen:  Have you fallen 2 or more times in the past year?: No  Have you fallen and had an injury in the past year?: No  Is patient receiving Physical Therapy Services?: Yes    Subjective     Joe is a 45 year old presenting with neck pain and stiffness, shoulder weakness, and ulnar nerve numbness.  She had been getting ulnar nerve numbness at night for some time, but recently she has been getting it more often during the day as well. 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 She has been working on her posture, but symptoms have not improved.         Presenting condition or subjective complaint:    Date of onset:          Prior diagnostic imaging/testing results:       Cervical MRI:  IMPRESSION:  1.  Degenerative changes in the cervical spine as detailed above.  2.  Mild spinal canal narrowings at C5-C6 and C4-C5.  3.  Mild right neural foraminal narrowing at C6-C7.  4.  Mild neural foraminal narrowings at C7-T1 from facet hypertrophy.  5.  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    EMG:  This is an abnormal study. There is electrophysiologic evidence of mild ulnar neuropathies across the elbows on both sides. Both the right and left ulnar neuropathy pathophysiology appears to be focal demyelination at this point. There is no evidence of loss of axonal integrity on the basis of this study. There is also no evidence of a cervical radiculopathy affecting the upper limbs. Clinical correlation is recommended.   Prior therapy history for the same diagnosis, illness or injury:        Prior Level of Function  Transfers: Independent  Ambulation: Independent  ADL: Independent  IADL: Driving, Finances, Housekeeping, Laundry, Meal preparation, Medication management, Work, Yard work    Living Environment  Social support:     Type of home:     Stairs to enter the home:          Ramp:     Stairs inside the home:         Help at home:    Equipment owned:       Employment:      Hobbies/Interests:      Patient goals for therapy:      Pain assessment: Location: Bilateral neck, ulnar forearm to 5th digits/Rating: N/A today     Objective   CERVICAL SPINE EVALUATION  PAIN: Pain is Exacerbated By: Work positions, holding arms out in front, prolonged forward head posture, sleeping  INTEGUMENTARY (edema, incisions): WNL  POSTURE: Forward head (35 deg CV angle), rounded shoulders, increased thoracic kyphosis, prominent C7    ROM:   (Degrees) Left AROM Right AROM    Cervical Flexion 60    Cervical Extension 40    Cervical Side bend      Cervical Rotation 40 40    Cervical Protrusion     Cervical Retraction     Thoracic Flexion     Thoracic Extension     Thoracic Rotation       Left AROM Left PROM Right AROM Right PROM   Shoulder Flexion WNL  WNL    Shoulder Extension       Shoulder Abduction WNL  WNL    Shoulder Adduction       Shoulder IR       Shoulder ER       Shoulder Horiz Abduction       Shoulder Horiz Adduction       35 deg Craniovertebral angle      NEURAL TENSION: +Ulnar B  FLEXIBILITY: Decreased scalenes L, Decreased pectoralis major L, Decreased pectoralis minor L, Decreased latissimus L, Decreased scalenes R, Decreased pectoralis major R, Decreased pectoralis minor R, Decreased latissimus R       Assessment & Plan   CLINICAL IMPRESSIONS  Medical Diagnosis: Cervical spondylosis with radiculopathy    Treatment Diagnosis: Neck pain and stiffness with B ulnar parasthesia   Impression/Assessment: Patient is a 45 year old female with Neck pain and mobility deficits with upper extremity parasthesias in ulnar distribution. She has evidence of both cervical/postural contributions as well as distal nerve entrapment contributing to her nerve symptoms.  The following significant findings have been identified: Pain, Decreased ROM/flexibility, Decreased joint mobility, Decreased strength, Impaired  balance, Decreased proprioception, Impaired sensation, Impaired muscle performance, Decreased activity tolerance, and Impaired posture. These impairments interfere with their ability to perform self care tasks, work tasks, recreational activities, household chores, driving , household mobility, and community mobility as compared to previous level of function.     Clinical Decision Making (Complexity):  Clinical Presentation: Stable/Uncomplicated  Clinical Presentation Rationale: based on medical and personal factors listed in PT evaluation  Clinical Decision Making (Complexity): Low complexity    PLAN OF CARE  Treatment Interventions:  Interventions: Gait Training, Manual Therapy, Neuromuscular Re-education, Therapeutic Activity, Therapeutic Exercise, Self-Care/Home Management    Long Term Goals     PT Goal 1  Goal Identifier: ROM  Goal Description: Patient will demonstrate at least 60 deg cervical rotation B  Rationale: to maximize safety and independence with performance of ADLs and functional tasks  Target Date: 10/25/25  PT Goal 2  Goal Identifier: Posture  Goal Description: Patient will demonstrate craniovertebral angle of >45 deg  Rationale: to maximize safety and independence with performance of ADLs and functional tasks  Target Date: 10/25/25  PT Goal 3  Goal Identifier: Sleep  Goal Description: Patient will sleep through the night without waking d/t ulnar nerve symptoms  Rationale: to maximize safety and independence with self cares  Target Date: 10/25/25      Frequency of Treatment: Weekly  Duration of Treatment: 90 days    Recommended Referrals to Other Professionals: None  Education Assessment:   Learner/Method: Patient;Listening;Reading;Demonstration;Pictures/Video;No Barriers to Learning    Risks and benefits of evaluation/treatment have been explained.   Patient/Family/caregiver agrees with Plan of Care.     Evaluation Time:     PT Eval, Low Complexity Minutes (84125): 13       Signing Clinician:  Marshal Frey, PT

## 2025-07-29 ENCOUNTER — HOSPITAL ENCOUNTER (OUTPATIENT)
Dept: CT IMAGING | Facility: CLINIC | Age: 45
Discharge: HOME | End: 2025-07-29
Attending: INTERNAL MEDICINE
Payer: COMMERCIAL

## 2025-07-29 VITALS — HEART RATE: 70 BPM | RESPIRATION RATE: 14 BRPM | DIASTOLIC BLOOD PRESSURE: 68 MMHG | SYSTOLIC BLOOD PRESSURE: 93 MMHG

## 2025-07-29 DIAGNOSIS — R07.9 CHEST PAIN, UNSPECIFIED TYPE: ICD-10-CM

## 2025-07-29 DIAGNOSIS — R94.6 THYROID FUNCTION TEST ABNORMAL: ICD-10-CM

## 2025-07-29 DIAGNOSIS — Z13.220 SCREENING CHOLESTEROL LEVEL: ICD-10-CM

## 2025-07-29 DIAGNOSIS — Z12.31 ENCOUNTER FOR SCREENING MAMMOGRAM FOR BREAST CANCER: ICD-10-CM

## 2025-07-29 DIAGNOSIS — Z12.11 SPECIAL SCREENING FOR MALIGNANT NEOPLASMS, COLON: ICD-10-CM

## 2025-07-29 PROCEDURE — 75574 CT ANGIO HRT W/3D IMAGE: CPT

## 2025-07-29 PROCEDURE — 250N000009 HC RX 250: Performed by: STUDENT IN AN ORGANIZED HEALTH CARE EDUCATION/TRAINING PROGRAM

## 2025-07-29 PROCEDURE — 75574 CT ANGIO HRT W/3D IMAGE: CPT | Mod: 26 | Performed by: STUDENT IN AN ORGANIZED HEALTH CARE EDUCATION/TRAINING PROGRAM

## 2025-07-29 PROCEDURE — 250N000011 HC RX IP 250 OP 636: Performed by: STUDENT IN AN ORGANIZED HEALTH CARE EDUCATION/TRAINING PROGRAM

## 2025-07-29 PROCEDURE — 250N000013 HC RX MED GY IP 250 OP 250 PS 637: Performed by: STUDENT IN AN ORGANIZED HEALTH CARE EDUCATION/TRAINING PROGRAM

## 2025-07-29 RX ORDER — IVABRADINE 5 MG/1
5-15 TABLET, FILM COATED ORAL
Status: COMPLETED | OUTPATIENT
Start: 2025-07-29 | End: 2025-07-29

## 2025-07-29 RX ORDER — ONDANSETRON 2 MG/ML
4 INJECTION INTRAMUSCULAR; INTRAVENOUS
Status: DISCONTINUED | OUTPATIENT
Start: 2025-07-29 | End: 2025-07-30 | Stop reason: HOSPADM

## 2025-07-29 RX ORDER — IOPAMIDOL 755 MG/ML
110 INJECTION, SOLUTION INTRAVASCULAR ONCE
Status: COMPLETED | OUTPATIENT
Start: 2025-07-29 | End: 2025-07-29

## 2025-07-29 RX ORDER — LIDOCAINE 40 MG/G
CREAM TOPICAL
Status: DISCONTINUED | OUTPATIENT
Start: 2025-07-29 | End: 2025-07-30 | Stop reason: HOSPADM

## 2025-07-29 RX ORDER — METOPROLOL TARTRATE 25 MG/1
25-100 TABLET, FILM COATED ORAL
Status: COMPLETED | OUTPATIENT
Start: 2025-07-29 | End: 2025-07-29

## 2025-07-29 RX ORDER — DILTIAZEM HYDROCHLORIDE 5 MG/ML
10-15 INJECTION INTRAVENOUS
Status: DISCONTINUED | OUTPATIENT
Start: 2025-07-29 | End: 2025-07-30 | Stop reason: HOSPADM

## 2025-07-29 RX ORDER — DILTIAZEM HYDROCHLORIDE 120 MG/1
120 TABLET, FILM COATED ORAL
Status: DISCONTINUED | OUTPATIENT
Start: 2025-07-29 | End: 2025-07-30 | Stop reason: HOSPADM

## 2025-07-29 RX ORDER — NITROGLYCERIN 0.4 MG/1
.4-.8 TABLET SUBLINGUAL
Status: DISCONTINUED | OUTPATIENT
Start: 2025-07-29 | End: 2025-07-30 | Stop reason: HOSPADM

## 2025-07-29 RX ORDER — METOPROLOL TARTRATE 1 MG/ML
5-20 INJECTION, SOLUTION INTRAVENOUS
Status: DISCONTINUED | OUTPATIENT
Start: 2025-07-29 | End: 2025-07-30 | Stop reason: HOSPADM

## 2025-07-29 RX ADMIN — METOPROLOL TARTRATE 10 MG: 5 INJECTION INTRAVENOUS at 09:38

## 2025-07-29 RX ADMIN — IVABRADINE 15 MG: 5 TABLET, FILM COATED ORAL at 08:33

## 2025-07-29 RX ADMIN — IOPAMIDOL 110 ML: 755 INJECTION, SOLUTION INTRAVENOUS at 10:04

## 2025-07-29 RX ADMIN — NITROGLYCERIN 0.8 MG: 0.4 TABLET SUBLINGUAL at 09:41

## 2025-07-29 RX ADMIN — METOPROLOL TARTRATE 100 MG: 100 TABLET, FILM COATED ORAL at 08:32

## 2025-07-29 NOTE — PROGRESS NOTES
Pt arrived for Coronary CT angiogram. Test, meds and side effects reviewed with pt. Resting HR 80 bpm. Given 100 mg PO Metoprolol + 15 mg PO Ivabradine per verbal order. Administered 0.8 mg SL nitro and 10 mg IV metoprolol on CTA table per order. CTA completed. Patient tolerated procedure well and denies symptoms of allergic reaction. Post monitoring completed and VSS. D/C instructions reviewed with pt whom verbalized understanding of need to increase PO fluids today. D/C to gold waiting room accompanied by staff.

## (undated) DEVICE — DAVINCI XI SEAL UNIVERSAL 5-8MM 470361

## (undated) DEVICE — LINEN TOWEL PACK X30 5481

## (undated) DEVICE — DAVINCI XI MONOPOLAR SCISSORS HOT SHEARS 8MM 470179

## (undated) DEVICE — STPL SKIN 35W ROTATING HEAD PRW35

## (undated) DEVICE — ENDO TROCAR CONMED AIRSEAL BLADELESS 08X120MM IAS8-120LP

## (undated) DEVICE — DAVINCI XI DRIVER NDL MEGA SUTURECUT 8MM EXT 471309

## (undated) DEVICE — WIPES FOLEY CARE SURESTEP PROVON DFC100

## (undated) DEVICE — DAVINCI XI ESU FORCE BIPOLAR 8MM 471405

## (undated) DEVICE — UTERINE MANIPULATOR RUMI 6.7MMX10CM UMG670

## (undated) DEVICE — PAD CHUX UNDERPAD 30X36" P3036C

## (undated) DEVICE — DAVINCI XI OBTURATOR BLADELESS 8MM 470359

## (undated) DEVICE — ENDO TROCAR BLUNT TIP KII BALLOON 12X100MM C0R47

## (undated) DEVICE — DRAPE UNDER BUTTOCK 8483

## (undated) DEVICE — BLADE KNIFE SURG 10 371110

## (undated) DEVICE — DAVINCI HOT SHEARS TIP COVER  400180

## (undated) DEVICE — SOL NACL 0.9% INJ 1000ML BAG 2B1324X

## (undated) DEVICE — Device

## (undated) DEVICE — ANTIFOG SOLUTION SEE SHARP 150M TROCAR SWABS 30978

## (undated) DEVICE — DRAPE POUCH IRR 1016

## (undated) DEVICE — SU MONOCRYL 4-0 PS-2 18" UND Y496G

## (undated) DEVICE — CATH TRAY FOLEY SURESTEP 16FR W/URINE MTR STATLK LF A303416A

## (undated) DEVICE — GLOVE BIOGEL PI MICRO INDICATOR UNDERGLOVE SZ 7.5 48975

## (undated) DEVICE — LINEN GOWN X4 5410

## (undated) DEVICE — BARRIER INTERCEED 3X4" 4350

## (undated) DEVICE — SU VICRYL 0 UR-6 27" J603H

## (undated) DEVICE — BLADE KNIFE SURG 11 371111

## (undated) DEVICE — PREP CHLORAPREP 26ML TINTED HI-LITE ORANGE 930815

## (undated) DEVICE — KIT POSITIONER PINK PAD XL ADVANCED 40588

## (undated) DEVICE — ANTIFOG SOLUTION W/FOAM PAD 31142527

## (undated) DEVICE — SUCTION MANIFOLD NEPTUNE 2 SYS 4 PORT 0702-020-000

## (undated) DEVICE — GLOVE BIOGEL PI MICRO SZ 7.0 48570

## (undated) DEVICE — DAVINCI XI DRAPE ARM 470015

## (undated) DEVICE — DAVINCI XI DRAPE COLUMN 470341

## (undated) DEVICE — PAD PERI INDIV WRAP 11" 2022A

## (undated) DEVICE — SYR 50ML LL W/O NDL 309653

## (undated) DEVICE — TUBING FILTER TRI-LUMEN AIRSEAL ASC-EVAC1

## (undated) DEVICE — STABILIZER ARCH KOH-EFFICIENT 3.0CM KC-ARCH-30

## (undated) DEVICE — SU WND CLOSURE VLOC 180 ABS 2-0 12" GS-21 VLOCL0315

## (undated) DEVICE — COVER CAMERA IN-LIGHT DISP LT-C02

## (undated) DEVICE — PREP DYNA-HEX 4% CHG SCRUB 4OZ BOTTLE MDS098710

## (undated) DEVICE — TISSUE EXTRACTOR SYSTEM ALEXIS GTK17

## (undated) DEVICE — ENDO ACCESS PLATFORM GELPOINT MINI CNGL3

## (undated) DEVICE — SYR 10ML SLIP TIP W/O NDL 303134

## (undated) DEVICE — SU DERMABOND ADVANCED .7ML DNX12

## (undated) DEVICE — ADAPTER DRAPE ALLY AU-AD

## (undated) RX ORDER — NITROGLYCERIN 0.4 MG/1
TABLET SUBLINGUAL
Status: DISPENSED
Start: 2025-07-29

## (undated) RX ORDER — FENTANYL CITRATE 50 UG/ML
INJECTION, SOLUTION INTRAMUSCULAR; INTRAVENOUS
Status: DISPENSED
Start: 2023-10-12

## (undated) RX ORDER — ACETAMINOPHEN 325 MG/1
TABLET ORAL
Status: DISPENSED
Start: 2024-01-12

## (undated) RX ORDER — HYDROMORPHONE HYDROCHLORIDE 1 MG/ML
INJECTION, SOLUTION INTRAMUSCULAR; INTRAVENOUS; SUBCUTANEOUS
Status: DISPENSED
Start: 2024-01-12

## (undated) RX ORDER — IVABRADINE 5 MG/1
TABLET, FILM COATED ORAL
Status: DISPENSED
Start: 2025-07-29

## (undated) RX ORDER — FENTANYL CITRATE 50 UG/ML
INJECTION, SOLUTION INTRAMUSCULAR; INTRAVENOUS
Status: DISPENSED
Start: 2024-01-12

## (undated) RX ORDER — CEFAZOLIN SODIUM/WATER 2 G/20 ML
SYRINGE (ML) INTRAVENOUS
Status: DISPENSED
Start: 2024-01-12

## (undated) RX ORDER — PHENAZOPYRIDINE HYDROCHLORIDE 200 MG/1
TABLET, FILM COATED ORAL
Status: DISPENSED
Start: 2024-01-12

## (undated) RX ORDER — OXYCODONE HYDROCHLORIDE 5 MG/1
TABLET ORAL
Status: DISPENSED
Start: 2024-01-12

## (undated) RX ORDER — METRONIDAZOLE 500 MG/100ML
INJECTION, SOLUTION INTRAVENOUS
Status: DISPENSED
Start: 2024-01-12

## (undated) RX ORDER — METOPROLOL TARTRATE 1 MG/ML
INJECTION, SOLUTION INTRAVENOUS
Status: DISPENSED
Start: 2025-07-29

## (undated) RX ORDER — ONDANSETRON 2 MG/ML
INJECTION INTRAMUSCULAR; INTRAVENOUS
Status: DISPENSED
Start: 2024-01-12

## (undated) RX ORDER — KETOROLAC TROMETHAMINE 30 MG/ML
INJECTION, SOLUTION INTRAMUSCULAR; INTRAVENOUS
Status: DISPENSED
Start: 2024-01-12

## (undated) RX ORDER — METOPROLOL TARTRATE 100 MG/1
TABLET ORAL
Status: DISPENSED
Start: 2025-07-29